# Patient Record
Sex: MALE | Race: WHITE | NOT HISPANIC OR LATINO | Employment: PART TIME | URBAN - METROPOLITAN AREA
[De-identification: names, ages, dates, MRNs, and addresses within clinical notes are randomized per-mention and may not be internally consistent; named-entity substitution may affect disease eponyms.]

---

## 2017-01-31 ENCOUNTER — OFFICE VISIT (OUTPATIENT)
Dept: CARDIOLOGY | Facility: MEDICAL CENTER | Age: 72
End: 2017-01-31
Payer: MEDICARE

## 2017-01-31 VITALS
HEIGHT: 68 IN | HEART RATE: 55 BPM | DIASTOLIC BLOOD PRESSURE: 70 MMHG | BODY MASS INDEX: 27.28 KG/M2 | WEIGHT: 180 LBS | SYSTOLIC BLOOD PRESSURE: 130 MMHG

## 2017-01-31 DIAGNOSIS — I10 ESSENTIAL HYPERTENSION, BENIGN: ICD-10-CM

## 2017-01-31 DIAGNOSIS — R93.1 ELEVATED CORONARY ARTERY CALCIUM SCORE: ICD-10-CM

## 2017-01-31 DIAGNOSIS — E78.5 DYSLIPIDEMIA: ICD-10-CM

## 2017-01-31 PROCEDURE — 1101F PT FALLS ASSESS-DOCD LE1/YR: CPT | Mod: 8P | Performed by: INTERNAL MEDICINE

## 2017-01-31 PROCEDURE — 4040F PNEUMOC VAC/ADMIN/RCVD: CPT | Mod: 8P | Performed by: INTERNAL MEDICINE

## 2017-01-31 PROCEDURE — G8432 DEP SCR NOT DOC, RNG: HCPCS | Performed by: INTERNAL MEDICINE

## 2017-01-31 PROCEDURE — 99214 OFFICE O/P EST MOD 30 MIN: CPT | Performed by: INTERNAL MEDICINE

## 2017-01-31 PROCEDURE — G8484 FLU IMMUNIZE NO ADMIN: HCPCS | Performed by: INTERNAL MEDICINE

## 2017-01-31 PROCEDURE — G8420 CALC BMI NORM PARAMETERS: HCPCS | Performed by: INTERNAL MEDICINE

## 2017-01-31 PROCEDURE — G8598 ASA/ANTIPLAT THER USED: HCPCS | Performed by: INTERNAL MEDICINE

## 2017-01-31 PROCEDURE — 1036F TOBACCO NON-USER: CPT | Performed by: INTERNAL MEDICINE

## 2017-01-31 PROCEDURE — 3017F COLORECTAL CA SCREEN DOC REV: CPT | Mod: 8P | Performed by: INTERNAL MEDICINE

## 2017-01-31 NOTE — PROGRESS NOTES
Subjective:   Jimenez Villalba is a 71 y.o. male who presents today for followup of his coronary disease with a mildly positive coronary calcium score, hyperlipidemia and elevated blood pressure. His echocardiogram showed moderate concentric left ventricular hypertrophy.     His blood pressures at home and running approximately 115-135/65-80.    He continues to exercise regularly. He has had no chest discomfort, dyspnea, edema, palpitations or lightheadedness.    Past Medical History   Diagnosis Date   • Pericarditis    • CAD (coronary artery disease) 12/21/2011   • Abnormal myocardial perfusion study 12/21/2011   • Hyperlipidemia 12/21/2011   • Hypertensive urgency 8/23/2013   • Abdominal pain, other specified site 8/23/2013   • Elevated blood pressure 5/20/2014   • Essential hypertension, benign 7/28/2015     History reviewed. No pertinent past surgical history.  Family History   Problem Relation Age of Onset   • Other Mother      lung ca   • Heart Attack Neg Hx      History   Smoking status   • Former Smoker -- 1.00 packs/day for 10 years   • Quit date: 12/21/1976   Smokeless tobacco   • Former User     Comment: 31 years ago     Allergies   Allergen Reactions   • Nkda [No Known Drug Allergy]      Outpatient Encounter Prescriptions as of 1/31/2017   Medication Sig Dispense Refill   • metoprolol SR (TOPROL XL) 25 MG TABLET SR 24 HR Take 0.5 Tabs by mouth every day. 45 Tab 3   • atorvastatin (LIPITOR) 40 MG Tab TAKE 1 TABLET BY MOUTH EVERY DAY 90 Tab 2   • Multiple Vitamin (MULTI VITAMIN DAILY PO) Take  by mouth.     • lisinopril (PRINIVIL) 5 MG Tab TAKE 1 TABLET BY MOUTH EVERY DAY 90 Tab 3   • Cholecalciferol (VITAMIN D) 400 UNIT Tab Take 400 Units by mouth every day.     • Multiple Vitamins-Minerals (OCUVITE PO) Take  by mouth.     • Omega-3 Fatty Acids (FISH OIL) 1000 MG Cap capsule Take 1,000 mg by mouth 3 times a day, with meals.     • cyanocobalamin (VITAMIN B-12) 100 MCG Tab Take 100 mcg by mouth every day.    "  • Calcium-Vitamin D-Vitamin K (CALCIUM + D + K PO) Take  by mouth.     • Saw Palmetto, Serenoa repens, (SAW PALMETTO PO) Take  by mouth.     • aspirin 81 MG tablet Take 81 mg by mouth every day.       No facility-administered encounter medications on file as of 1/31/2017.     ROS       Objective:   /70 mmHg  Pulse 55  Ht 1.727 m (5' 8\")  Wt 81.647 kg (180 lb)  BMI 27.38 kg/m2    Physical Exam   Neck: No JVD present.   Cardiovascular: Normal rate and regular rhythm.  Exam reveals no gallop.    No murmur heard.  Pulmonary/Chest: Effort normal. He has no rales.   Abdominal: Soft. There is no tenderness.   Musculoskeletal: He exhibits no edema.       Echocardiogram:  CONCLUSIONS  Normal left ventricular chamber size. Moderate concentric left   ventricular hypertrophy, 1.5 cm. Normal regional wall motion. Normal   left ventricular systolic function. Left ventricular ejection fraction   is 65% to 70%. Grade I diastolic dysfunction - mitral inflow E/A is   <1.0.    Exam Date:         05/12/2015       Lab Results   Component Value Date/Time    CHOLESTEROL, 01/18/2017 01:47 PM    LDL 69 01/18/2017 01:47 PM    HDL 83 01/18/2017 01:47 PM    TRIGLYCERIDES 56 01/18/2017 01:47 PM       Lab Results   Component Value Date/Time    SODIUM 139 01/18/2017 01:47 PM    POTASSIUM 4.4 01/18/2017 01:47 PM    CHLORIDE 100 01/18/2017 01:47 PM    CO2 23 01/18/2017 01:47 PM    GLUCOSE 91 01/18/2017 01:47 PM    BUN 11 01/18/2017 01:47 PM    CREATININE 0.79 01/18/2017 01:47 PM    BUN-CREATININE RATIO 14 01/18/2017 01:47 PM     Lab Results   Component Value Date/Time    ALKALINE PHOSPHATASE 55 01/18/2017 01:47 PM    AST(SGOT) 21 01/18/2017 01:47 PM    ALT(SGPT) 24 01/18/2017 01:47 PM    TOTAL BILIRUBIN 0.6 01/18/2017 01:47 PM      Lab Results   Component Value Date/Time    B NATRIURETIC PEPTIDE 24 10/29/2009 12:25 PM                 Assessment:     1. Elevated coronary artery calcium score     2. Dyslipidemia     3. Essential " hypertension, benign         Medical Decision Making:  Today's Assessment / Status / Plan:     Mr. Villalba is clinically stable. His blood pressure and lipid status under good control. He is asymptomatic from a cardiovascular standpoint. He will follow-up in about a year with repeat lab work.

## 2017-01-31 NOTE — Clinical Note
[facility]   [address], [address]  [place] [address]  Phone: [phone]  Fax: [phone]              Jimenez Villalba  1945    Encounter Date: 1/31/2017    Jose L Mcdonald M.D.          PROGRESS NOTE:  Subjective:   Jimenez Villalba is a 71 y.o. male who presents today for followup of his coronary disease with a mildly positive coronary calcium score, hyperlipidemia and elevated blood pressure. His echocardiogram showed moderate concentric left ventricular hypertrophy.     His blood pressures at home and running approximately 115-135/65-80.    He continues to exercise regularly. He has had no chest discomfort, dyspnea, edema, palpitations or lightheadedness.    Past Medical History   Diagnosis Date   • Pericarditis    • CAD (coronary artery disease) 12/21/2011   • Abnormal myocardial perfusion study 12/21/2011   • Hyperlipidemia 12/21/2011   • Hypertensive urgency 8/23/2013   • Abdominal pain, other specified site 8/23/2013   • Elevated blood pressure 5/20/2014   • Essential hypertension, benign 7/28/2015     History reviewed. No pertinent past surgical history.  Family History   Problem Relation Age of Onset   • Other Mother      lung ca   • Heart Attack Neg Hx      History   Smoking status   • Former Smoker -- 1.00 packs/day for 10 years   • Quit date: 12/21/1976   Smokeless tobacco   • Former User     Comment: 31 years ago     Allergies   Allergen Reactions   • Nkda [No Known Drug Allergy]      Outpatient Encounter Prescriptions as of 1/31/2017   Medication Sig Dispense Refill   • metoprolol SR (TOPROL XL) 25 MG TABLET SR 24 HR Take 0.5 Tabs by mouth every day. 45 Tab 3   • atorvastatin (LIPITOR) 40 MG Tab TAKE 1 TABLET BY MOUTH EVERY DAY 90 Tab 2   • Multiple Vitamin (MULTI VITAMIN DAILY PO) Take  by mouth.     • lisinopril (PRINIVIL) 5 MG Tab TAKE 1 TABLET BY MOUTH EVERY DAY 90 Tab 3   • Cholecalciferol (VITAMIN D) 400 UNIT Tab Take 400 Units by  "mouth every day.     • Multiple Vitamins-Minerals (OCUVITE PO) Take  by mouth.     • Omega-3 Fatty Acids (FISH OIL) 1000 MG Cap capsule Take 1,000 mg by mouth 3 times a day, with meals.     • cyanocobalamin (VITAMIN B-12) 100 MCG Tab Take 100 mcg by mouth every day.     • Calcium-Vitamin D-Vitamin K (CALCIUM + D + K PO) Take  by mouth.     • Saw Palmetto, Serenoa repens, (SAW PALMETTO PO) Take  by mouth.     • aspirin 81 MG tablet Take 81 mg by mouth every day.       No facility-administered encounter medications on file as of 1/31/2017.     ROS       Objective:   /70 mmHg  Pulse 55  Ht 1.727 m (5' 8\")  Wt 81.647 kg (180 lb)  BMI 27.38 kg/m2    Physical Exam   Neck: No JVD present.   Cardiovascular: Normal rate and regular rhythm.  Exam reveals no gallop.    No murmur heard.  Pulmonary/Chest: Effort normal. He has no rales.   Abdominal: Soft. There is no tenderness.   Musculoskeletal: He exhibits no edema.       Echocardiogram:  CONCLUSIONS  Normal left ventricular chamber size. Moderate concentric left   ventricular hypertrophy, 1.5 cm. Normal regional wall motion. Normal   left ventricular systolic function. Left ventricular ejection fraction   is 65% to 70%. Grade I diastolic dysfunction - mitral inflow E/A is   <1.0.    Exam Date:         05/12/2015       Lab Results   Component Value Date/Time    CHOLESTEROL, 01/18/2017 01:47 PM    LDL 69 01/18/2017 01:47 PM    HDL 83 01/18/2017 01:47 PM    TRIGLYCERIDES 56 01/18/2017 01:47 PM       Lab Results   Component Value Date/Time    SODIUM 139 01/18/2017 01:47 PM    POTASSIUM 4.4 01/18/2017 01:47 PM    CHLORIDE 100 01/18/2017 01:47 PM    CO2 23 01/18/2017 01:47 PM    GLUCOSE 91 01/18/2017 01:47 PM    BUN 11 01/18/2017 01:47 PM    CREATININE 0.79 01/18/2017 01:47 PM    BUN-CREATININE RATIO 14 01/18/2017 01:47 PM     Lab Results   Component Value Date/Time    ALKALINE PHOSPHATASE 55 01/18/2017 01:47 PM    AST(SGOT) 21 01/18/2017 01:47 PM    ALT(SGPT) 24 " 01/18/2017 01:47 PM    TOTAL BILIRUBIN 0.6 01/18/2017 01:47 PM      Lab Results   Component Value Date/Time    B NATRIURETIC PEPTIDE 24 10/29/2009 12:25 PM                 Assessment:     1. Elevated coronary artery calcium score     2. Dyslipidemia     3. Essential hypertension, benign         Medical Decision Making:  Today's Assessment / Status / Plan:     Mr. Villalba is clinically stable. His blood pressure and lipid status under good control. He is asymptomatic from a cardiovascular standpoint. He will follow-up in about a year with repeat lab work.      Ashley Ayala M.D.  [address] [place] [id number]  VIA Facsimile: [phone]

## 2017-01-31 NOTE — MR AVS SNAPSHOT
"        Jimenez CUETO Deejay   2017 9:00 AM   Office Visit   MRN: [id number]    Department:  [facility]   Dept Phone:  [phone]    Description:  Male : 1945   Provider:  Jose L Mcdonald M.D.           Reason for Visit     Follow-Up labs in epic 2017      Allergies as of 2017     Allergen Noted Reactions    Nkda [No Known Drug Allergy] 10/29/2009         You were diagnosed with     Elevated coronary artery calcium score   [4520578]       Dyslipidemia   [685364]       Essential hypertension, benign   [401.1.ICD-9-CM]         Vital Signs     Blood Pressure Pulse Height Weight Body Mass Index Smoking Status    130/70 mmHg 55 1.727 m (5' 8\") 81.647 kg (180 lb) 27.38 kg/m2 Former Smoker      Basic Information     Date Of Birth Sex Race Ethnicity Preferred Language    1945 Male White Non- English      Problem List              ICD-10-CM Priority Class Noted - Resolved    Elevated coronary artery calcium score I25.10   2011 - Present    Abnormal myocardial perfusion study    2011 - Present    Dyslipidemia E78.5   2011 - Present    Abdominal pain, other specified site R10.9   2013 - Present    Hypertensive urgency I16.0   2013 - Present    Elevated blood pressure I10   2014 - Present    Essential hypertension, benign I10   2015 - Present      Health Maintenance        Date Due Completion Dates    IMM DTaP/Tdap/Td Vaccine (1 - Tdap) 1965 ---    COLONOSCOPY 1996 ---    IMM ZOSTER VACCINE 2006 ---    IMM PNEUMOCOCCAL 65+ (ADULT) LOW/MEDIUM RISK SERIES (1 of 2 - PCV13) 2011 ---    IMM INFLUENZA (1) 2016 ---            Current Immunizations     Influenza Vaccine 10/6/2009      Below and/or attached are the medications your provider expects you to take. Review all of your home medications and newly ordered medications with your provider and/or pharmacist. Follow medication instructions as directed by your provider and/or " pharmacist. Please keep your medication list with you and share with your provider. Update the information when medications are discontinued, doses are changed, or new medications (including over-the-counter products) are added; and carry medication information at all times in the event of emergency situations     Allergies:  NKDA - (reactions not documented)               Medications  Valid as of: January 31, 2017 -  9:39 AM    Generic Name Brand Name Tablet Size Instructions for use    Aspirin (Tab) aspirin 81 MG Take 81 mg by mouth every day.        Atorvastatin Calcium (Tab) LIPITOR 40 MG TAKE 1 TABLET BY MOUTH EVERY DAY        Calcium-Vitamin D-Vitamin K   Take  by mouth.        Cholecalciferol (Tab) VITAMIN D 400 UNIT Take 400 Units by mouth every day.        Cyanocobalamin (Tab) VITAMIN B-12 100 MCG Take 100 mcg by mouth every day.        Lisinopril (Tab) PRINIVIL 5 MG TAKE 1 TABLET BY MOUTH EVERY DAY        Metoprolol Succinate (TABLET SR 24 HR) TOPROL XL 25 MG Take 0.5 Tabs by mouth every day.        Multiple Vitamin   Take  by mouth.        Multiple Vitamins-Minerals   Take  by mouth.        Omega-3 Fatty Acids (Cap) fish oil 1000 MG Take 1,000 mg by mouth 3 times a day, with meals.        Saw Palmetto (Serenoa repens)   Take  by mouth.        .                 Medicines prescribed today were sent to:     [facility] [address] [place] [address] & [place]    [address] [place] [id number]    Phone: [phone] Fax: [phone]    Open 24 Hours?: No      Medication refill instructions:       If your prescription bottle indicates you have medication refills left, it is not necessary to call your provider’s office. Please contact your pharmacy and they will refill your medication.    If your prescription bottle indicates you do not have any refills left, you may request refills at any time through one of the following ways: The online [employer] system (except Urgent  Care), by calling your provider’s office, or by asking your pharmacy to contact your provider’s office with a refill request. Medication refills are processed only during regular business hours and may not be available until the next business day. Your provider may request additional information or to have a follow-up visit with you prior to refilling your medication.   *Please Note: Medication refills are assigned a new Rx number when refilled electronically. Your pharmacy may indicate that no refills were authorized even though a new prescription for the same medication is available at the pharmacy. Please request the medicine by name with the pharmacy before contacting your provider for a refill.        Your To Do List     Future Labs/Procedures Complete By Expires    COMP METABOLIC PANEL  As directed 1/31/2018    LIPID PROFILE  As directed 1/31/2018         FTL SOLAR Access Code: [email or web address]  Expires: 3/2/2017  9:39 AM    FTL SOLAR  A secure, online tool to manage your health information     [employer]’s FTL SOLAR® is a secure, online tool that connects you to your personalized health information from the privacy of your home -- day or night - making it very easy for you to manage your healthcare. Once the activation process is completed, you can even access your medical information using the FTL SOLAR maribell, which is available for free in the Apple Maribell store or Google Play store.     FTL SOLAR provides the following levels of access (as shown below):   My Chart Features   [facility] Doctor [facility]  Urgent  Care Non-[facility]  Primary Care  Doctor   Email your healthcare team securely and privately 24/7 X X X    Manage appointments: schedule your next appointment; view details of past/upcoming appointments X      Request prescription refills. X      View recent personal medical records, including lab and immunizations X X X X   View health record, including health history, allergies,  medications X X X X   Read reports about your outpatient visits, procedures, consult and ER notes X X X X   See your discharge summary, which is a recap of your hospital and/or ER visit that includes your diagnosis, lab results, and care plan. X X       How to register for "[employer]:  1. Go to  [email or web address].  2. Click on the Sign Up Now box, which takes you to the New Member Sign Up page. You will need to provide the following information:  a. Enter your "[employer] Access Code exactly as it appears at the top of this page. (You will not need to use this code after you’ve completed the sign-up process. If you do not sign up before the expiration date, you must request a new code.)   b. Enter your date of birth.   c. Enter your home email address.   d. Click Submit, and follow the next screen’s instructions.  3. Create a "[employer] ID. This will be your "[employer] login ID and cannot be changed, so think of one that is secure and easy to remember.  4. Create a "[employer] password. You can change your password at any time.  5. Enter your Password Reset Question and Answer. This can be used at a later time if you forget your password.   6. Enter your e-mail address. This allows you to receive e-mail notifications when new information is available in "[employer].  7. Click Sign Up. You can now view your health information.    For assistance activating your "[employer] account, call [phone]

## 2017-05-25 DIAGNOSIS — E78.5 DYSLIPIDEMIA: ICD-10-CM

## 2017-05-26 RX ORDER — ATORVASTATIN CALCIUM 40 MG/1
TABLET, FILM COATED ORAL
Qty: 90 TAB | Refills: 3 | Status: SHIPPED | OUTPATIENT
Start: 2017-05-26 | End: 2018-05-17 | Stop reason: SDUPTHER

## 2017-08-07 DIAGNOSIS — I10 ESSENTIAL HYPERTENSION: ICD-10-CM

## 2017-08-10 RX ORDER — LISINOPRIL 5 MG/1
TABLET ORAL
Qty: 90 TAB | Refills: 2 | Status: SHIPPED | OUTPATIENT
Start: 2017-08-10 | End: 2018-05-14 | Stop reason: SDUPTHER

## 2017-12-06 DIAGNOSIS — I25.84 CORONARY ARTERY DISEASE DUE TO CALCIFIED CORONARY LESION: ICD-10-CM

## 2017-12-06 DIAGNOSIS — I25.10 CORONARY ARTERY DISEASE DUE TO CALCIFIED CORONARY LESION: ICD-10-CM

## 2017-12-06 RX ORDER — METOPROLOL SUCCINATE 25 MG/1
12.5 TABLET, EXTENDED RELEASE ORAL DAILY
Qty: 45 TAB | Refills: 0 | Status: SHIPPED | OUTPATIENT
Start: 2017-12-06 | End: 2018-03-24 | Stop reason: SDUPTHER

## 2018-03-21 LAB
ALBUMIN SERPL-MCNC: 4.4 G/DL (ref 3.5–4.8)
ALBUMIN/GLOB SERPL: 1.6 {RATIO} (ref 1.2–2.2)
ALP SERPL-CCNC: 67 IU/L (ref 39–117)
ALT SERPL-CCNC: 28 IU/L (ref 0–44)
AST SERPL-CCNC: 29 IU/L (ref 0–40)
BILIRUB SERPL-MCNC: 0.6 MG/DL (ref 0–1.2)
BUN SERPL-MCNC: 9 MG/DL (ref 8–27)
BUN/CREAT SERPL: 12 (ref 10–24)
CALCIUM SERPL-MCNC: 9.7 MG/DL (ref 8.6–10.2)
CHLORIDE SERPL-SCNC: 103 MMOL/L (ref 96–106)
CHOLEST SERPL-MCNC: 147 MG/DL (ref 100–199)
CO2 SERPL-SCNC: 26 MMOL/L (ref 18–29)
CREAT SERPL-MCNC: 0.73 MG/DL (ref 0.76–1.27)
GFR SERPLBLD CREATININE-BSD FMLA CKD-EPI: 108 ML/MIN/1.73
GFR SERPLBLD CREATININE-BSD FMLA CKD-EPI: 93 ML/MIN/1.73
GLOBULIN SER CALC-MCNC: 2.7 G/DL (ref 1.5–4.5)
GLUCOSE SERPL-MCNC: 82 MG/DL (ref 65–99)
HDLC SERPL-MCNC: 69 MG/DL
LABORATORY COMMENT REPORT: NORMAL
LDLC SERPL CALC-MCNC: 62 MG/DL (ref 0–99)
POTASSIUM SERPL-SCNC: 4.6 MMOL/L (ref 3.5–5.2)
PROT SERPL-MCNC: 7.1 G/DL (ref 6–8.5)
SODIUM SERPL-SCNC: 144 MMOL/L (ref 134–144)
TRIGL SERPL-MCNC: 81 MG/DL (ref 0–149)
VLDLC SERPL CALC-MCNC: 16 MG/DL (ref 5–40)

## 2018-03-24 DIAGNOSIS — I25.10 CORONARY ARTERY DISEASE DUE TO CALCIFIED CORONARY LESION: ICD-10-CM

## 2018-03-24 DIAGNOSIS — I25.84 CORONARY ARTERY DISEASE DUE TO CALCIFIED CORONARY LESION: ICD-10-CM

## 2018-03-27 RX ORDER — METOPROLOL SUCCINATE 25 MG/1
TABLET, EXTENDED RELEASE ORAL
Qty: 45 TAB | Refills: 0 | Status: SHIPPED | OUTPATIENT
Start: 2018-03-27 | End: 2018-06-19 | Stop reason: SDUPTHER

## 2018-03-29 ENCOUNTER — OFFICE VISIT (OUTPATIENT)
Dept: CARDIOLOGY | Facility: MEDICAL CENTER | Age: 73
End: 2018-03-29
Payer: MEDICARE

## 2018-03-29 VITALS
OXYGEN SATURATION: 96 % | DIASTOLIC BLOOD PRESSURE: 60 MMHG | HEIGHT: 68 IN | BODY MASS INDEX: 27.58 KG/M2 | SYSTOLIC BLOOD PRESSURE: 112 MMHG | WEIGHT: 182 LBS | HEART RATE: 52 BPM

## 2018-03-29 DIAGNOSIS — I25.10 CORONARY ARTERY CALCIFICATION SEEN ON CT SCAN: ICD-10-CM

## 2018-03-29 DIAGNOSIS — I10 ESSENTIAL HYPERTENSION, BENIGN: ICD-10-CM

## 2018-03-29 DIAGNOSIS — E78.5 DYSLIPIDEMIA: ICD-10-CM

## 2018-03-29 PROCEDURE — 99214 OFFICE O/P EST MOD 30 MIN: CPT | Performed by: INTERNAL MEDICINE

## 2018-03-29 RX ORDER — INFLUENZA A VIRUS A/MICHIGAN/45/2015 X-275 (H1N1) ANTIGEN (FORMALDEHYDE INACTIVATED), INFLUENZA A VIRUS A/SINGAPORE/INFIMH-16-0019/2016 IVR-186 (H3N2) ANTIGEN (FORMALDEHYDE INACTIVATED), AND INFLUENZA B VIRUS B/MARYLAND/15/2016 BX-69A (A B/COLORADO/6/2017-LIKE VIRUS) ANTIGEN (FORMALDEHYDE INACTIVATED) 60; 60; 60 UG/.5ML; UG/.5ML; UG/.5ML
INJECTION, SUSPENSION INTRAMUSCULAR
COMMUNITY
Start: 2018-01-30 | End: 2021-10-13

## 2018-03-29 NOTE — LETTER
[facility]   [address], [address]  [place] [address]  Phone: [phone]  Fax: [phone]              Jimenez Villalba  1945    Encounter Date: 3/29/2018    Jose L Mcdonald M.D.          PROGRESS NOTE:  Chief Complaint   Patient presents with   • Coronary Artery Disease   • HTN (Controlled)       Subjective:   Jimenez Villalba is a 72 y.o. male who presents today for followup of his coronary disease with a mildly positive coronary calcium score, hyperlipidemia and elevated blood pressure. His echocardiogram showed moderate concentric left ventricular hypertrophy.    He is walking 4-5 miles 4-5 days weekly. He's had no chest discomfort, dyspnea, edema, palpitations or lightheadedness.    His blood pressures at home and running from approximately 105-135/65-80. Generally, systolic pressures are between 115 and 125.    Past Medical History:   Diagnosis Date   • Abdominal pain, other specified site 8/23/2013   • Abnormal myocardial perfusion study 12/21/2011   • CAD (coronary artery disease) 12/21/2011   • Elevated blood pressure 5/20/2014   • Essential hypertension, benign 7/28/2015   • Hyperlipidemia 12/21/2011   • Hypertensive urgency 8/23/2013   • Pericarditis      History reviewed. No pertinent surgical history.  Family History   Problem Relation Age of Onset   • Other Mother      lung ca   • Heart Attack Neg Hx      Social History     Social History   • Marital status:      Spouse name: N/A   • Number of children: N/A   • Years of education: N/A     Occupational History   • Not on file.     Social History Main Topics   • Smoking status: Former Smoker     Packs/day: 1.00     Years: 10.00     Quit date: 12/21/1976   • Smokeless tobacco: Former User      Comment: 31 years ago   • Alcohol use 10.0 oz/week     20 Glasses of wine per week      Comment: daily   • Drug use: Unknown   • Sexual activity: Not on file     Other Topics Concern   • Not on file  "    Social History Narrative   • No narrative on file     Allergies   Allergen Reactions   • Nkda [No Known Drug Allergy]      Outpatient Encounter Prescriptions as of 3/29/2018   Medication Sig Dispense Refill   • metoprolol SR (TOPROL XL) 25 MG TABLET SR 24 HR TAKE 1/2 TABLET BY MOUTH EVERY DAY 45 Tab 0   • lisinopril (PRINIVIL) 5 MG Tab TAKE 1 TABLET BY MOUTH EVERY DAY 90 Tab 2   • atorvastatin (LIPITOR) 40 MG Tab TAKE 1 TABLET BY MOUTH EVERY DAY 90 Tab 3   • Multiple Vitamin (MULTI VITAMIN DAILY PO) Take  by mouth.     • Cholecalciferol (VITAMIN D) 400 UNIT Tab Take 400 Units by mouth every day.     • Multiple Vitamins-Minerals (OCUVITE PO) Take  by mouth.     • Omega-3 Fatty Acids (FISH OIL) 1000 MG Cap capsule Take 1,000 mg by mouth 3 times a day, with meals.     • cyanocobalamin (VITAMIN B-12) 100 MCG Tab Take 100 mcg by mouth every day.     • Calcium-Vitamin D-Vitamin K (CALCIUM + D + K PO) Take  by mouth.     • Saw Palmetto, Serenoa repens, (SAW PALMETTO PO) Take  by mouth.     • aspirin 81 MG tablet Take 81 mg by mouth every day.     • FLUZONE HIGH-DOSE 0.5 ML Suspension Prefilled Syringe injection        No facility-administered encounter medications on file as of 3/29/2018.      ROS     Objective:   /60   Pulse (!) 52   Ht 1.727 m (5' 8\")   Wt 82.6 kg (182 lb)   SpO2 96%   BMI 27.67 kg/m²      Physical Exam   Neck: No JVD present.   Cardiovascular: Normal rate and regular rhythm.    No murmur heard.  Pulmonary/Chest: Effort normal and breath sounds normal. He has no rales.   Abdominal: Soft. There is no tenderness.   Musculoskeletal: He exhibits no edema.       Lab Results   Component Value Date/Time    CHOLSTRLTOT 147 03/20/2018 03:25 PM    CHOLSTRLTOT 150 04/26/2013 09:20 AM    LDL 62 03/20/2018 03:25 PM    LDL 58 04/26/2013 09:20 AM    HDL 69 03/20/2018 03:25 PM    HDL 72 04/26/2013 09:20 AM    TRIGLYCERIDE 81 03/20/2018 03:25 PM    TRIGLYCERIDE 98 04/26/2013 09:20 AM       Lab Results   "   Component Value Date/Time    SODIUM 144 03/20/2018 03:25 PM    SODIUM 136 08/24/2013 05:30 AM    POTASSIUM 4.6 03/20/2018 03:25 PM    POTASSIUM 4.1 08/24/2013 05:30 AM    CHLORIDE 103 03/20/2018 03:25 PM    CHLORIDE 104 08/24/2013 05:30 AM    CO2 26 03/20/2018 03:25 PM    CO2 27 08/24/2013 05:30 AM    GLUCOSE 82 03/20/2018 03:25 PM    GLUCOSE 101 (H) 08/24/2013 05:30 AM    BUN 9 03/20/2018 03:25 PM    BUN 9 08/24/2013 05:30 AM    CREATININE 0.73 (L) 03/20/2018 03:25 PM    CREATININE 0.89 08/24/2013 05:30 AM    BUNCREATRAT 12 03/20/2018 03:25 PM     Lab Results   Component Value Date/Time    ALKPHOSPHAT 67 03/20/2018 03:25 PM    ALKPHOSPHAT 37 08/24/2013 05:30 AM    ASTSGOT 29 03/20/2018 03:25 PM    ASTSGOT 20 08/24/2013 05:30 AM    ALTSGPT 28 03/20/2018 03:25 PM    ALTSGPT 22 08/24/2013 05:30 AM    TBILIRUBIN 0.6 03/20/2018 03:25 PM    TBILIRUBIN 1.1 08/24/2013 05:30 AM        Echocardiogram:  CONCLUSIONS  Normal left ventricular chamber size. Moderate concentric left   ventricular hypertrophy, 1.5 cm. Normal regional wall motion. Normal   left ventricular systolic function. Left ventricular ejection fraction   is 65% to 70%. Grade I diastolic dysfunction - mitral inflow E/A is   <1.0.    Exam Date:         05/12/2015       Assessment:     1. Coronary artery calcification seen on CT scan    2. Dyslipidemia    3. Essential hypertension, benign          Medical Decision Making:  Today's Assessment / Status / Plan:     Mr. Villalba is clinically stable. We discussed the possibility of increase atorvastatin but he would like to stay on his current dosage. His LDL is under fairly good control and he is on 40 mg daily. His blood pressure is under excellent control. I feel he can follow up in a year with repeat lab work.        Ashley Ayala M.D.  [address] [place] [id number]  VIA Facsimile: [phone]

## 2018-03-29 NOTE — PROGRESS NOTES
Chief Complaint   Patient presents with   • Coronary Artery Disease   • HTN (Controlled)       Subjective:   Jimenez Villalba is a 72 y.o. male who presents today for followup of his coronary disease with a mildly positive coronary calcium score, hyperlipidemia and elevated blood pressure. His echocardiogram showed moderate concentric left ventricular hypertrophy.    He is walking 4-5 miles 4-5 days weekly. He's had no chest discomfort, dyspnea, edema, palpitations or lightheadedness.    His blood pressures at home and running from approximately 105-135/65-80. Generally, systolic pressures are between 115 and 125.    Past Medical History:   Diagnosis Date   • Abdominal pain, other specified site 8/23/2013   • Abnormal myocardial perfusion study 12/21/2011   • CAD (coronary artery disease) 12/21/2011   • Elevated blood pressure 5/20/2014   • Essential hypertension, benign 7/28/2015   • Hyperlipidemia 12/21/2011   • Hypertensive urgency 8/23/2013   • Pericarditis      History reviewed. No pertinent surgical history.  Family History   Problem Relation Age of Onset   • Other Mother      lung ca   • Heart Attack Neg Hx      Social History     Social History   • Marital status:      Spouse name: N/A   • Number of children: N/A   • Years of education: N/A     Occupational History   • Not on file.     Social History Main Topics   • Smoking status: Former Smoker     Packs/day: 1.00     Years: 10.00     Quit date: 12/21/1976   • Smokeless tobacco: Former User      Comment: 31 years ago   • Alcohol use 10.0 oz/week     20 Glasses of wine per week      Comment: daily   • Drug use: Unknown   • Sexual activity: Not on file     Other Topics Concern   • Not on file     Social History Narrative   • No narrative on file     Allergies   Allergen Reactions   • Nkda [No Known Drug Allergy]      Outpatient Encounter Prescriptions as of 3/29/2018   Medication Sig Dispense Refill   • metoprolol SR (TOPROL XL) 25 MG TABLET SR 24 HR  "TAKE 1/2 TABLET BY MOUTH EVERY DAY 45 Tab 0   • lisinopril (PRINIVIL) 5 MG Tab TAKE 1 TABLET BY MOUTH EVERY DAY 90 Tab 2   • atorvastatin (LIPITOR) 40 MG Tab TAKE 1 TABLET BY MOUTH EVERY DAY 90 Tab 3   • Multiple Vitamin (MULTI VITAMIN DAILY PO) Take  by mouth.     • Cholecalciferol (VITAMIN D) 400 UNIT Tab Take 400 Units by mouth every day.     • Multiple Vitamins-Minerals (OCUVITE PO) Take  by mouth.     • Omega-3 Fatty Acids (FISH OIL) 1000 MG Cap capsule Take 1,000 mg by mouth 3 times a day, with meals.     • cyanocobalamin (VITAMIN B-12) 100 MCG Tab Take 100 mcg by mouth every day.     • Calcium-Vitamin D-Vitamin K (CALCIUM + D + K PO) Take  by mouth.     • Saw Palmetto, Serenoa repens, (SAW PALMETTO PO) Take  by mouth.     • aspirin 81 MG tablet Take 81 mg by mouth every day.     • FLUZONE HIGH-DOSE 0.5 ML Suspension Prefilled Syringe injection        No facility-administered encounter medications on file as of 3/29/2018.      ROS     Objective:   /60   Pulse (!) 52   Ht 1.727 m (5' 8\")   Wt 82.6 kg (182 lb)   SpO2 96%   BMI 27.67 kg/m²     Physical Exam   Neck: No JVD present.   Cardiovascular: Normal rate and regular rhythm.    No murmur heard.  Pulmonary/Chest: Effort normal and breath sounds normal. He has no rales.   Abdominal: Soft. There is no tenderness.   Musculoskeletal: He exhibits no edema.       Lab Results   Component Value Date/Time    CHOLSTRLTOT 147 03/20/2018 03:25 PM    CHOLSTRLTOT 150 04/26/2013 09:20 AM    LDL 62 03/20/2018 03:25 PM    LDL 58 04/26/2013 09:20 AM    HDL 69 03/20/2018 03:25 PM    HDL 72 04/26/2013 09:20 AM    TRIGLYCERIDE 81 03/20/2018 03:25 PM    TRIGLYCERIDE 98 04/26/2013 09:20 AM       Lab Results   Component Value Date/Time    SODIUM 144 03/20/2018 03:25 PM    SODIUM 136 08/24/2013 05:30 AM    POTASSIUM 4.6 03/20/2018 03:25 PM    POTASSIUM 4.1 08/24/2013 05:30 AM    CHLORIDE 103 03/20/2018 03:25 PM    CHLORIDE 104 08/24/2013 05:30 AM    CO2 26 03/20/2018 03:25 " PM    CO2 27 08/24/2013 05:30 AM    GLUCOSE 82 03/20/2018 03:25 PM    GLUCOSE 101 (H) 08/24/2013 05:30 AM    BUN 9 03/20/2018 03:25 PM    BUN 9 08/24/2013 05:30 AM    CREATININE 0.73 (L) 03/20/2018 03:25 PM    CREATININE 0.89 08/24/2013 05:30 AM    BUNCREATRAT 12 03/20/2018 03:25 PM     Lab Results   Component Value Date/Time    ALKPHOSPHAT 67 03/20/2018 03:25 PM    ALKPHOSPHAT 37 08/24/2013 05:30 AM    ASTSGOT 29 03/20/2018 03:25 PM    ASTSGOT 20 08/24/2013 05:30 AM    ALTSGPT 28 03/20/2018 03:25 PM    ALTSGPT 22 08/24/2013 05:30 AM    TBILIRUBIN 0.6 03/20/2018 03:25 PM    TBILIRUBIN 1.1 08/24/2013 05:30 AM        Echocardiogram:  CONCLUSIONS  Normal left ventricular chamber size. Moderate concentric left   ventricular hypertrophy, 1.5 cm. Normal regional wall motion. Normal   left ventricular systolic function. Left ventricular ejection fraction   is 65% to 70%. Grade I diastolic dysfunction - mitral inflow E/A is   <1.0.    Exam Date:         05/12/2015       Assessment:     1. Coronary artery calcification seen on CT scan    2. Dyslipidemia    3. Essential hypertension, benign          Medical Decision Making:  Today's Assessment / Status / Plan:     Mr. Villalba is clinically stable. We discussed the possibility of increase atorvastatin but he would like to stay on his current dosage. His LDL is under fairly good control and he is on 40 mg daily. His blood pressure is under excellent control. I feel he can follow up in a year with repeat lab work.

## 2018-05-14 DIAGNOSIS — I10 ESSENTIAL HYPERTENSION: ICD-10-CM

## 2018-05-16 RX ORDER — LISINOPRIL 5 MG/1
TABLET ORAL
Qty: 90 TAB | Refills: 3 | Status: SHIPPED | OUTPATIENT
Start: 2018-05-16 | End: 2019-05-12 | Stop reason: SDUPTHER

## 2018-05-17 DIAGNOSIS — E78.5 DYSLIPIDEMIA: ICD-10-CM

## 2018-05-21 RX ORDER — ATORVASTATIN CALCIUM 40 MG/1
TABLET, FILM COATED ORAL
Qty: 90 TAB | Refills: 3 | Status: SHIPPED | OUTPATIENT
Start: 2018-05-21 | End: 2019-05-27 | Stop reason: SDUPTHER

## 2018-06-19 DIAGNOSIS — I25.10 CORONARY ARTERY DISEASE DUE TO CALCIFIED CORONARY LESION: ICD-10-CM

## 2018-06-19 DIAGNOSIS — I25.84 CORONARY ARTERY DISEASE DUE TO CALCIFIED CORONARY LESION: ICD-10-CM

## 2018-06-21 RX ORDER — METOPROLOL SUCCINATE 25 MG/1
TABLET, EXTENDED RELEASE ORAL
Qty: 45 TAB | Refills: 3 | Status: SHIPPED | OUTPATIENT
Start: 2018-06-21 | End: 2019-06-18 | Stop reason: SDUPTHER

## 2019-03-16 LAB
ALBUMIN SERPL-MCNC: 4.2 G/DL (ref 3.5–4.8)
ALBUMIN/GLOB SERPL: 1.8 {RATIO} (ref 1.2–2.2)
ALP SERPL-CCNC: 57 IU/L (ref 39–117)
ALT SERPL-CCNC: 23 IU/L (ref 0–44)
AST SERPL-CCNC: 26 IU/L (ref 0–40)
BILIRUB SERPL-MCNC: 0.6 MG/DL (ref 0–1.2)
BUN SERPL-MCNC: 9 MG/DL (ref 8–27)
BUN/CREAT SERPL: 13 (ref 10–24)
CALCIUM SERPL-MCNC: 9 MG/DL (ref 8.6–10.2)
CHLORIDE SERPL-SCNC: 107 MMOL/L (ref 96–106)
CHOLEST SERPL-MCNC: 150 MG/DL (ref 100–199)
CO2 SERPL-SCNC: 23 MMOL/L (ref 20–29)
CREAT SERPL-MCNC: 0.67 MG/DL (ref 0.76–1.27)
GLOBULIN SER CALC-MCNC: 2.4 G/DL (ref 1.5–4.5)
GLUCOSE SERPL-MCNC: 86 MG/DL (ref 65–99)
HDLC SERPL-MCNC: 72 MG/DL
LABORATORY COMMENT REPORT: NORMAL
LDLC SERPL CALC-MCNC: 66 MG/DL (ref 0–99)
POTASSIUM SERPL-SCNC: 4.4 MMOL/L (ref 3.5–5.2)
PROT SERPL-MCNC: 6.6 G/DL (ref 6–8.5)
SODIUM SERPL-SCNC: 146 MMOL/L (ref 134–144)
TRIGL SERPL-MCNC: 62 MG/DL (ref 0–149)
VLDLC SERPL CALC-MCNC: 12 MG/DL (ref 5–40)

## 2019-03-28 ENCOUNTER — OFFICE VISIT (OUTPATIENT)
Dept: CARDIOLOGY | Facility: MEDICAL CENTER | Age: 74
End: 2019-03-28
Payer: MEDICARE

## 2019-03-28 VITALS
WEIGHT: 185 LBS | BODY MASS INDEX: 28.04 KG/M2 | SYSTOLIC BLOOD PRESSURE: 124 MMHG | OXYGEN SATURATION: 98 % | HEART RATE: 64 BPM | HEIGHT: 68 IN | DIASTOLIC BLOOD PRESSURE: 80 MMHG

## 2019-03-28 DIAGNOSIS — I25.84 CORONARY ARTERY DISEASE DUE TO CALCIFIED CORONARY LESION: ICD-10-CM

## 2019-03-28 DIAGNOSIS — E78.5 DYSLIPIDEMIA: ICD-10-CM

## 2019-03-28 DIAGNOSIS — I10 ESSENTIAL HYPERTENSION, BENIGN: ICD-10-CM

## 2019-03-28 DIAGNOSIS — I25.10 CORONARY ARTERY DISEASE DUE TO CALCIFIED CORONARY LESION: ICD-10-CM

## 2019-03-28 DIAGNOSIS — Z91.89 OTHER SPECIFIED PERSONAL RISK FACTORS, NOT ELSEWHERE CLASSIFIED: ICD-10-CM

## 2019-03-28 DIAGNOSIS — R94.31 NONSPECIFIC ABNORMAL ELECTROCARDIOGRAM (ECG) (EKG): ICD-10-CM

## 2019-03-28 DIAGNOSIS — I25.10 CORONARY ARTERY CALCIFICATION SEEN ON CT SCAN: ICD-10-CM

## 2019-03-28 PROCEDURE — 99214 OFFICE O/P EST MOD 30 MIN: CPT | Performed by: INTERNAL MEDICINE

## 2019-03-28 PROCEDURE — 93000 ELECTROCARDIOGRAM COMPLETE: CPT | Performed by: INTERNAL MEDICINE

## 2019-03-28 NOTE — LETTER
[facility]   [address], [address]  [place] [address]  Phone: [phone]  Fax: [phone]              Jimenez Villalba  1945    Encounter Date: 3/28/2019    Jose L Mcdonald M.D.          PROGRESS NOTE:  Chief Complaint   Patient presents with   • Hyperlipidemia     F/v: 1 YR/ LABS IN EPIC       Subjective:   Jimenez Villalba is a 73 y.o. male who presents today for followup of his coronary disease with a mildly positive coronary calcium score, hyperlipidemia and elevated blood pressure. His echocardiogram showed moderate concentric left ventricular hypertrophy.    His blood pressures at home of been running approximately 120/75.    He is walking for at least an hour 3-4 times a week.  He denies any chest discomfort, dyspnea, edema, palpitations or lightheadedness.          Past Medical History:   Diagnosis Date   • Abdominal pain, other specified site 8/23/2013   • Abnormal myocardial perfusion study 12/21/2011   • CAD (coronary artery disease) 12/21/2011   • Elevated blood pressure 5/20/2014   • Essential hypertension, benign 7/28/2015   • Hyperlipidemia 12/21/2011   • Hypertensive urgency 8/23/2013   • Pericarditis      History reviewed. No pertinent surgical history.  Family History   Problem Relation Age of Onset   • Other Mother         lung ca   • Heart Attack Neg Hx      Social History     Social History   • Marital status:      Spouse name: N/A   • Number of children: N/A   • Years of education: N/A     Occupational History   • Not on file.     Social History Main Topics   • Smoking status: Former Smoker     Packs/day: 1.00     Years: 10.00     Quit date: 12/21/1976   • Smokeless tobacco: Former User      Comment: 31 years ago   • Alcohol use 10.0 oz/week     20 Glasses of wine per week      Comment: daily   • Drug use: Unknown   • Sexual activity: Not on file     Other Topics Concern   • Not on file     Social History Narrative   • No  "narrative on file     Allergies   Allergen Reactions   • Nkda [No Known Drug Allergy]      Outpatient Encounter Prescriptions as of 3/28/2019   Medication Sig Dispense Refill   • Multiple Vitamins-Minerals (PRESERVISION AREDS 2 PO) Take  by mouth.     • metoprolol SR (TOPROL XL) 25 MG TABLET SR 24 HR TAKE 1/2 TABLET BY MOUTH EVERY DAY 45 Tab 3   • atorvastatin (LIPITOR) 40 MG Tab TAKE 1 TABLET BY MOUTH EVERY DAY 90 Tab 3   • lisinopril (PRINIVIL) 5 MG Tab TAKE 1 TABLET BY MOUTH EVERY DAY 90 Tab 3   • Multiple Vitamin (MULTI VITAMIN DAILY PO) Take  by mouth.     • Cholecalciferol (VITAMIN D) 400 UNIT Tab Take 400 Units by mouth every day.     • Omega-3 Fatty Acids (FISH OIL) 1000 MG Cap capsule Take 1,000 mg by mouth 3 times a day, with meals.     • cyanocobalamin (VITAMIN B-12) 100 MCG Tab Take 100 mcg by mouth every day.     • Calcium-Vitamin D-Vitamin K (CALCIUM + D + K PO) Take  by mouth.     • Saw Palmetto, Serenoa repens, (SAW PALMETTO PO) Take  by mouth.     • aspirin 81 MG tablet Take 81 mg by mouth every day.     • FLUZONE HIGH-DOSE 0.5 ML Suspension Prefilled Syringe injection      • Multiple Vitamins-Minerals (OCUVITE PO) Take  by mouth.       No facility-administered encounter medications on file as of 3/28/2019.      ROS     Objective:   /80 (BP Location: Left arm, Patient Position: Sitting, BP Cuff Size: Adult)   Pulse 64   Ht 1.727 m (5' 8\")   Wt 83.9 kg (185 lb)   SpO2 98%   BMI 28.13 kg/m²      Physical Exam   Constitutional: He appears well-developed and well-nourished.   Neck: No JVD present.   Cardiovascular: Normal rate.  An irregular rhythm present. Frequent extrasystoles are present.   No murmur heard.  Pulmonary/Chest: Effort normal and breath sounds normal. He has no rales.   Abdominal: Soft. There is no tenderness.   Musculoskeletal: He exhibits no edema.     Lab Results   Component Value Date/Time    CHOLSTRLTOT 150 03/15/2019 03:08 PM    CHOLSTRLTOT 150 04/26/2013 09:20 AM    " LDL 66 03/15/2019 03:08 PM    LDL 58 04/26/2013 09:20 AM    HDL 72 03/15/2019 03:08 PM    HDL 72 04/26/2013 09:20 AM    TRIGLYCERIDE 62 03/15/2019 03:08 PM    TRIGLYCERIDE 98 04/26/2013 09:20 AM       Lab Results   Component Value Date/Time    SODIUM 146 (H) 03/15/2019 03:08 PM    SODIUM 136 08/24/2013 05:30 AM    POTASSIUM 4.4 03/15/2019 03:08 PM    POTASSIUM 4.1 08/24/2013 05:30 AM    CHLORIDE 107 (H) 03/15/2019 03:08 PM    CHLORIDE 104 08/24/2013 05:30 AM    CO2 23 03/15/2019 03:08 PM    CO2 27 08/24/2013 05:30 AM    GLUCOSE 86 03/15/2019 03:08 PM    GLUCOSE 101 (H) 08/24/2013 05:30 AM    BUN 9 03/15/2019 03:08 PM    BUN 9 08/24/2013 05:30 AM    CREATININE 0.67 (L) 03/15/2019 03:08 PM    CREATININE 0.89 08/24/2013 05:30 AM    BUNCREATRAT 13 03/15/2019 03:08 PM     Lab Results   Component Value Date/Time    ALKPHOSPHAT 57 03/15/2019 03:08 PM    ALKPHOSPHAT 37 08/24/2013 05:30 AM    ASTSGOT 26 03/15/2019 03:08 PM    ASTSGOT 20 08/24/2013 05:30 AM    ALTSGPT 23 03/15/2019 03:08 PM    ALTSGPT 22 08/24/2013 05:30 AM    TBILIRUBIN 0.6 03/15/2019 03:08 PM    TBILIRUBIN 1.1 08/24/2013 05:30 AM      Lab Results   Component Value Date/Time    BNPBTYPENAT 24 10/29/2009 12:25 PM      EKG: This shows a normal sinus rhythm with multiple PACs.  There is poor anterior R wave progression with a possible old anterior infarction.    Assessment:     1. Coronary artery calcification seen on CT scan     2. Dyslipidemia     3. Essential hypertension, benign         Medical Decision Making:  Today's Assessment / Status / Plan:     Mr. Villalba is clinically stable.  He was having frequent ectopics on examination today and his EKG showed frequent PACs.  However, poor anterior R wave progression with a possible prior anterior infarction was noted on his EKG which was not mentioned on his stress test in 2009.  At this time, I feel he should be further evaluated with a stress echocardiogram.  He will follow-up in a couple of months, after  that procedure.  He is leaving for vacation for several weeks.  Lipid status and blood pressure appear to be under good control and he is asymptomatic from a cardiovascular standpoint.  He is exercising regularly without difficulty.      Ashley Ayala M.D.  [address] [place] [id number]  VIA Facsimile: [phone]

## 2019-03-28 NOTE — PROGRESS NOTES
Chief Complaint   Patient presents with   • Hyperlipidemia     F/v: 1 YR/ LABS IN EPIC       Subjective:   Jimenez Villalba is a 73 y.o. male who presents today for followup of his coronary disease with a mildly positive coronary calcium score, hyperlipidemia and elevated blood pressure. His echocardiogram showed moderate concentric left ventricular hypertrophy.    His blood pressures at home of been running approximately 120/75.    He is walking for at least an hour 3-4 times a week.  He denies any chest discomfort, dyspnea, edema, palpitations or lightheadedness.          Past Medical History:   Diagnosis Date   • Abdominal pain, other specified site 8/23/2013   • Abnormal myocardial perfusion study 12/21/2011   • CAD (coronary artery disease) 12/21/2011   • Elevated blood pressure 5/20/2014   • Essential hypertension, benign 7/28/2015   • Hyperlipidemia 12/21/2011   • Hypertensive urgency 8/23/2013   • Pericarditis      History reviewed. No pertinent surgical history.  Family History   Problem Relation Age of Onset   • Other Mother         lung ca   • Heart Attack Neg Hx      Social History     Social History   • Marital status:      Spouse name: N/A   • Number of children: N/A   • Years of education: N/A     Occupational History   • Not on file.     Social History Main Topics   • Smoking status: Former Smoker     Packs/day: 1.00     Years: 10.00     Quit date: 12/21/1976   • Smokeless tobacco: Former User      Comment: 31 years ago   • Alcohol use 10.0 oz/week     20 Glasses of wine per week      Comment: daily   • Drug use: Unknown   • Sexual activity: Not on file     Other Topics Concern   • Not on file     Social History Narrative   • No narrative on file     Allergies   Allergen Reactions   • Nkda [No Known Drug Allergy]      Outpatient Encounter Prescriptions as of 3/28/2019   Medication Sig Dispense Refill   • Multiple Vitamins-Minerals (PRESERVISION AREDS 2 PO) Take  by mouth.     • metoprolol SR  "(TOPROL XL) 25 MG TABLET SR 24 HR TAKE 1/2 TABLET BY MOUTH EVERY DAY 45 Tab 3   • atorvastatin (LIPITOR) 40 MG Tab TAKE 1 TABLET BY MOUTH EVERY DAY 90 Tab 3   • lisinopril (PRINIVIL) 5 MG Tab TAKE 1 TABLET BY MOUTH EVERY DAY 90 Tab 3   • Multiple Vitamin (MULTI VITAMIN DAILY PO) Take  by mouth.     • Cholecalciferol (VITAMIN D) 400 UNIT Tab Take 400 Units by mouth every day.     • Omega-3 Fatty Acids (FISH OIL) 1000 MG Cap capsule Take 1,000 mg by mouth 3 times a day, with meals.     • cyanocobalamin (VITAMIN B-12) 100 MCG Tab Take 100 mcg by mouth every day.     • Calcium-Vitamin D-Vitamin K (CALCIUM + D + K PO) Take  by mouth.     • Saw Palmetto, Serenoa repens, (SAW PALMETTO PO) Take  by mouth.     • aspirin 81 MG tablet Take 81 mg by mouth every day.     • FLUZONE HIGH-DOSE 0.5 ML Suspension Prefilled Syringe injection      • Multiple Vitamins-Minerals (OCUVITE PO) Take  by mouth.       No facility-administered encounter medications on file as of 3/28/2019.      ROS     Objective:   /80 (BP Location: Left arm, Patient Position: Sitting, BP Cuff Size: Adult)   Pulse 64   Ht 1.727 m (5' 8\")   Wt 83.9 kg (185 lb)   SpO2 98%   BMI 28.13 kg/m²     Physical Exam   Constitutional: He appears well-developed and well-nourished.   Neck: No JVD present.   Cardiovascular: Normal rate.  An irregular rhythm present. Frequent extrasystoles are present.   No murmur heard.  Pulmonary/Chest: Effort normal and breath sounds normal. He has no rales.   Abdominal: Soft. There is no tenderness.   Musculoskeletal: He exhibits no edema.     Lab Results   Component Value Date/Time    CHOLSTRLTOT 150 03/15/2019 03:08 PM    CHOLSTRLTOT 150 04/26/2013 09:20 AM    LDL 66 03/15/2019 03:08 PM    LDL 58 04/26/2013 09:20 AM    HDL 72 03/15/2019 03:08 PM    HDL 72 04/26/2013 09:20 AM    TRIGLYCERIDE 62 03/15/2019 03:08 PM    TRIGLYCERIDE 98 04/26/2013 09:20 AM       Lab Results   Component Value Date/Time    SODIUM 146 (H) 03/15/2019 " 03:08 PM    SODIUM 136 08/24/2013 05:30 AM    POTASSIUM 4.4 03/15/2019 03:08 PM    POTASSIUM 4.1 08/24/2013 05:30 AM    CHLORIDE 107 (H) 03/15/2019 03:08 PM    CHLORIDE 104 08/24/2013 05:30 AM    CO2 23 03/15/2019 03:08 PM    CO2 27 08/24/2013 05:30 AM    GLUCOSE 86 03/15/2019 03:08 PM    GLUCOSE 101 (H) 08/24/2013 05:30 AM    BUN 9 03/15/2019 03:08 PM    BUN 9 08/24/2013 05:30 AM    CREATININE 0.67 (L) 03/15/2019 03:08 PM    CREATININE 0.89 08/24/2013 05:30 AM    BUNCREATRAT 13 03/15/2019 03:08 PM     Lab Results   Component Value Date/Time    ALKPHOSPHAT 57 03/15/2019 03:08 PM    ALKPHOSPHAT 37 08/24/2013 05:30 AM    ASTSGOT 26 03/15/2019 03:08 PM    ASTSGOT 20 08/24/2013 05:30 AM    ALTSGPT 23 03/15/2019 03:08 PM    ALTSGPT 22 08/24/2013 05:30 AM    TBILIRUBIN 0.6 03/15/2019 03:08 PM    TBILIRUBIN 1.1 08/24/2013 05:30 AM      Lab Results   Component Value Date/Time    BNPBTYPENAT 24 10/29/2009 12:25 PM      EKG: This shows a normal sinus rhythm with multiple PACs.  There is poor anterior R wave progression with a possible old anterior infarction.    Assessment:     1. Coronary artery calcification seen on CT scan     2. Dyslipidemia     3. Essential hypertension, benign         Medical Decision Making:  Today's Assessment / Status / Plan:     Mr. Villalba is clinically stable.  He was having frequent ectopics on examination today and his EKG showed frequent PACs.  However, poor anterior R wave progression with a possible prior anterior infarction was noted on his EKG which was not mentioned on his stress test in 2009.  At this time, I feel he should be further evaluated with a stress echocardiogram.  He will follow-up in a couple of months, after that procedure.  He is leaving for vacation for several weeks.  Lipid status and blood pressure appear to be under good control and he is asymptomatic from a cardiovascular standpoint.  He is exercising regularly without difficulty.

## 2019-03-29 LAB — EKG IMPRESSION: NORMAL

## 2019-04-23 ENCOUNTER — HOSPITAL ENCOUNTER (OUTPATIENT)
Dept: CARDIOLOGY | Facility: MEDICAL CENTER | Age: 74
End: 2019-04-23
Attending: INTERNAL MEDICINE
Payer: MEDICARE

## 2019-04-23 DIAGNOSIS — I25.10 CORONARY ARTERY DISEASE DUE TO CALCIFIED CORONARY LESION: ICD-10-CM

## 2019-04-23 DIAGNOSIS — R94.31 NONSPECIFIC ABNORMAL ELECTROCARDIOGRAM (ECG) (EKG): ICD-10-CM

## 2019-04-23 DIAGNOSIS — I25.84 CORONARY ARTERY DISEASE DUE TO CALCIFIED CORONARY LESION: ICD-10-CM

## 2019-04-23 LAB — LV EJECT FRACT  99904: 65

## 2019-04-23 PROCEDURE — 93350 STRESS TTE ONLY: CPT | Mod: 26 | Performed by: INTERNAL MEDICINE

## 2019-04-23 PROCEDURE — 93018 CV STRESS TEST I&R ONLY: CPT | Performed by: INTERNAL MEDICINE

## 2019-04-23 PROCEDURE — 93017 CV STRESS TEST TRACING ONLY: CPT

## 2019-05-12 DIAGNOSIS — I10 ESSENTIAL HYPERTENSION: ICD-10-CM

## 2019-05-14 RX ORDER — LISINOPRIL 5 MG/1
TABLET ORAL
Qty: 90 TAB | Refills: 3 | Status: SHIPPED | OUTPATIENT
Start: 2019-05-14 | End: 2020-05-04 | Stop reason: SDUPTHER

## 2019-05-27 DIAGNOSIS — E78.5 DYSLIPIDEMIA: ICD-10-CM

## 2019-05-28 ENCOUNTER — OFFICE VISIT (OUTPATIENT)
Dept: CARDIOLOGY | Facility: MEDICAL CENTER | Age: 74
End: 2019-05-28
Payer: MEDICARE

## 2019-05-28 VITALS
DIASTOLIC BLOOD PRESSURE: 80 MMHG | HEART RATE: 68 BPM | WEIGHT: 185.1 LBS | SYSTOLIC BLOOD PRESSURE: 128 MMHG | BODY MASS INDEX: 28.05 KG/M2 | OXYGEN SATURATION: 96 % | HEIGHT: 68 IN

## 2019-05-28 DIAGNOSIS — I25.84 CORONARY ARTERY DISEASE DUE TO CALCIFIED CORONARY LESION: ICD-10-CM

## 2019-05-28 DIAGNOSIS — I25.10 CORONARY ARTERY DISEASE DUE TO CALCIFIED CORONARY LESION: ICD-10-CM

## 2019-05-28 DIAGNOSIS — I10 ESSENTIAL HYPERTENSION, BENIGN: ICD-10-CM

## 2019-05-28 DIAGNOSIS — E78.5 DYSLIPIDEMIA: ICD-10-CM

## 2019-05-28 PROCEDURE — 99214 OFFICE O/P EST MOD 30 MIN: CPT | Performed by: INTERNAL MEDICINE

## 2019-05-28 NOTE — PROGRESS NOTES
Chief Complaint   Patient presents with   • Coronary Artery Disease       Subjective:   Jimenez Villalba is a 73 y.o. male who presents today for followup of his coronary disease with a mildly positive coronary calcium score, hyperlipidemia and elevated blood pressure. His echocardiogram showed moderate concentric left ventricular hypertrophy.    He denies any chest discomfort, dyspnea, edema, palpitations or lightheadedness.  He is walking 3 to 4 days a week for about an hour but is not dyspneic with this.    His blood pressures at home of been running about 120/70    Past Medical History:   Diagnosis Date   • Abdominal pain, other specified site 8/23/2013   • Abnormal myocardial perfusion study 12/21/2011   • CAD (coronary artery disease) 12/21/2011   • Elevated blood pressure 5/20/2014   • Essential hypertension, benign 7/28/2015   • Hyperlipidemia 12/21/2011   • Hypertensive urgency 8/23/2013   • Pericarditis      History reviewed. No pertinent surgical history.  Family History   Problem Relation Age of Onset   • Other Mother         lung ca   • Heart Attack Neg Hx      Social History     Social History   • Marital status:      Spouse name: N/A   • Number of children: N/A   • Years of education: N/A     Occupational History   • Not on file.     Social History Main Topics   • Smoking status: Former Smoker     Packs/day: 1.00     Years: 10.00     Quit date: 12/21/1976   • Smokeless tobacco: Former User      Comment: 31 years ago   • Alcohol use 10.0 oz/week     20 Glasses of wine per week      Comment: daily   • Drug use: Unknown   • Sexual activity: Not on file     Other Topics Concern   • Not on file     Social History Narrative   • No narrative on file     Allergies   Allergen Reactions   • Nkda [No Known Drug Allergy]      Outpatient Encounter Prescriptions as of 5/28/2019   Medication Sig Dispense Refill   • lisinopril (PRINIVIL) 5 MG Tab TAKE 1 TABLET BY MOUTH EVERY DAY 90 Tab 3   • Multiple  "Vitamins-Minerals (PRESERVISION AREDS 2 PO) Take  by mouth.     • metoprolol SR (TOPROL XL) 25 MG TABLET SR 24 HR TAKE 1/2 TABLET BY MOUTH EVERY DAY 45 Tab 3   • atorvastatin (LIPITOR) 40 MG Tab TAKE 1 TABLET BY MOUTH EVERY DAY 90 Tab 3   • Cholecalciferol (VITAMIN D) 400 UNIT Tab Take 400 Units by mouth every day.     • Omega-3 Fatty Acids (FISH OIL) 1000 MG Cap capsule Take 1,000 mg by mouth 3 times a day, with meals.     • cyanocobalamin (VITAMIN B-12) 100 MCG Tab Take 100 mcg by mouth every day.     • Calcium-Vitamin D-Vitamin K (CALCIUM + D + K PO) Take  by mouth.     • Saw Palmetto, Serenoa repens, (SAW PALMETTO PO) Take  by mouth.     • aspirin 81 MG tablet Take 81 mg by mouth every day.     • FLUZONE HIGH-DOSE 0.5 ML Suspension Prefilled Syringe injection      • Multiple Vitamin (MULTI VITAMIN DAILY PO) Take  by mouth.     • Multiple Vitamins-Minerals (OCUVITE PO) Take  by mouth.       No facility-administered encounter medications on file as of 5/28/2019.      ROS     Objective:   /80 (BP Location: Left arm, Patient Position: Sitting, BP Cuff Size: Adult)   Pulse 68   Ht 1.727 m (5' 8\")   Wt 84 kg (185 lb 1.6 oz)   SpO2 96%   BMI 28.14 kg/m²     Physical Exam   Constitutional: He appears well-developed and well-nourished.   Neck: No JVD present.   Cardiovascular: Normal rate and regular rhythm.    No murmur heard.  Pulmonary/Chest: Effort normal and breath sounds normal. He has no rales.   Abdominal: Soft. There is no tenderness.   Musculoskeletal: He exhibits no edema.     Lab Results   Component Value Date/Time    CHOLSTRLTOT 150 03/15/2019 03:08 PM    CHOLSTRLTOT 150 04/26/2013 09:20 AM    LDL 66 03/15/2019 03:08 PM    LDL 58 04/26/2013 09:20 AM    HDL 72 03/15/2019 03:08 PM    HDL 72 04/26/2013 09:20 AM    TRIGLYCERIDE 62 03/15/2019 03:08 PM    TRIGLYCERIDE 98 04/26/2013 09:20 AM       Lab Results   Component Value Date/Time    SODIUM 146 (H) 03/15/2019 03:08 PM    SODIUM 136 08/24/2013 05:30 " AM    POTASSIUM 4.4 03/15/2019 03:08 PM    POTASSIUM 4.1 2013 05:30 AM    CHLORIDE 107 (H) 03/15/2019 03:08 PM    CHLORIDE 104 2013 05:30 AM    CO2 23 03/15/2019 03:08 PM    CO2 27 2013 05:30 AM    GLUCOSE 86 03/15/2019 03:08 PM    GLUCOSE 101 (H) 2013 05:30 AM    BUN 9 03/15/2019 03:08 PM    BUN 9 2013 05:30 AM    CREATININE 0.67 (L) 03/15/2019 03:08 PM    CREATININE 0.89 2013 05:30 AM    BUNCREATRAT 13 03/15/2019 03:08 PM     Lab Results   Component Value Date/Time    ALKPHOSPHAT 57 03/15/2019 03:08 PM    ALKPHOSPHAT 37 2013 05:30 AM    ASTSGOT 26 03/15/2019 03:08 PM    ASTSGOT 20 2013 05:30 AM    ALTSGPT 23 03/15/2019 03:08 PM    ALTSGPT 22 2013 05:30 AM    TBILIRUBIN 0.6 03/15/2019 03:08 PM    TBILIRUBIN 1.1 2013 05:30 AM      Lab Results   Component Value Date/Time    BNPBTYPENAT 24 10/29/2009 12:25 PM          Echocardiography Laboratory  CONCLUSIONS  Negative stress echocardiogram for ischemia with adequate stress   achieved by heart rate criteria.     ERNESTINA POPE  Age:    73    Gender:    M  MRN:    [id number]  :    1945  Exam Date:           2019       Assessment:     1. Coronary artery disease due to calcified coronary lesion: Coronary calcification seen on CT     2. Dyslipidemia     3. Essential hypertension, benign         Medical Decision Making:  Today's Assessment / Status / Plan:     Mr. Pope is clinically stable.  His blood pressure and lipid status appear to be under good control.  He will follow-up in about a year with repeat lab work.  His exercise tolerance was not optimal on his stress echo.  We did discuss a better aerobic conditioning program.

## 2019-05-28 NOTE — LETTER
[facility], formerly [facility]   [address]  [place] [address]  Phone: [phone]  Fax: [phone]              Jimenez Villalba  1945    Encounter Date: 5/28/2019    Jose L Mcdonald M.D.          PROGRESS NOTE:  Chief Complaint   Patient presents with   • Coronary Artery Disease       Subjective:   Jimenez Villalba is a 73 y.o. male who presents today for followup of his coronary disease with a mildly positive coronary calcium score, hyperlipidemia and elevated blood pressure. His echocardiogram showed moderate concentric left ventricular hypertrophy.    He denies any chest discomfort, dyspnea, edema, palpitations or lightheadedness.  He is walking 3 to 4 days a week for about an hour but is not dyspneic with this.    His blood pressures at home of been running about 120/70    Past Medical History:   Diagnosis Date   • Abdominal pain, other specified site 8/23/2013   • Abnormal myocardial perfusion study 12/21/2011   • CAD (coronary artery disease) 12/21/2011   • Elevated blood pressure 5/20/2014   • Essential hypertension, benign 7/28/2015   • Hyperlipidemia 12/21/2011   • Hypertensive urgency 8/23/2013   • Pericarditis      History reviewed. No pertinent surgical history.  Family History   Problem Relation Age of Onset   • Other Mother         lung ca   • Heart Attack Neg Hx      Social History     Social History   • Marital status:      Spouse name: N/A   • Number of children: N/A   • Years of education: N/A     Occupational History   • Not on file.     Social History Main Topics   • Smoking status: Former Smoker     Packs/day: 1.00     Years: 10.00     Quit date: 12/21/1976   • Smokeless tobacco: Former User      Comment: 31 years ago   • Alcohol use 10.0 oz/week     20 Glasses of wine per week      Comment: daily   • Drug use: Unknown   • Sexual activity: Not on file     Other Topics Concern   • Not on file     Social History Narrative   • No narrative  "on file     Allergies   Allergen Reactions   • Nkda [No Known Drug Allergy]      Outpatient Encounter Prescriptions as of 5/28/2019   Medication Sig Dispense Refill   • lisinopril (PRINIVIL) 5 MG Tab TAKE 1 TABLET BY MOUTH EVERY DAY 90 Tab 3   • Multiple Vitamins-Minerals (PRESERVISION AREDS 2 PO) Take  by mouth.     • metoprolol SR (TOPROL XL) 25 MG TABLET SR 24 HR TAKE 1/2 TABLET BY MOUTH EVERY DAY 45 Tab 3   • atorvastatin (LIPITOR) 40 MG Tab TAKE 1 TABLET BY MOUTH EVERY DAY 90 Tab 3   • Cholecalciferol (VITAMIN D) 400 UNIT Tab Take 400 Units by mouth every day.     • Omega-3 Fatty Acids (FISH OIL) 1000 MG Cap capsule Take 1,000 mg by mouth 3 times a day, with meals.     • cyanocobalamin (VITAMIN B-12) 100 MCG Tab Take 100 mcg by mouth every day.     • Calcium-Vitamin D-Vitamin K (CALCIUM + D + K PO) Take  by mouth.     • Saw Palmetto, Serenoa repens, (SAW PALMETTO PO) Take  by mouth.     • aspirin 81 MG tablet Take 81 mg by mouth every day.     • FLUZONE HIGH-DOSE 0.5 ML Suspension Prefilled Syringe injection      • Multiple Vitamin (MULTI VITAMIN DAILY PO) Take  by mouth.     • Multiple Vitamins-Minerals (OCUVITE PO) Take  by mouth.       No facility-administered encounter medications on file as of 5/28/2019.      ROS     Objective:   /80 (BP Location: Left arm, Patient Position: Sitting, BP Cuff Size: Adult)   Pulse 68   Ht 1.727 m (5' 8\")   Wt 84 kg (185 lb 1.6 oz)   SpO2 96%   BMI 28.14 kg/m²      Physical Exam   Constitutional: He appears well-developed and well-nourished.   Neck: No JVD present.   Cardiovascular: Normal rate and regular rhythm.    No murmur heard.  Pulmonary/Chest: Effort normal and breath sounds normal. He has no rales.   Abdominal: Soft. There is no tenderness.   Musculoskeletal: He exhibits no edema.     Lab Results   Component Value Date/Time    CHOLSTRLTOT 150 03/15/2019 03:08 PM    CHOLSTRLTOT 150 04/26/2013 09:20 AM    LDL 66 03/15/2019 03:08 PM    LDL 58 04/26/2013 09:20 " AM    HDL 72 03/15/2019 03:08 PM    HDL 72 2013 09:20 AM    TRIGLYCERIDE 62 03/15/2019 03:08 PM    TRIGLYCERIDE 98 2013 09:20 AM       Lab Results   Component Value Date/Time    SODIUM 146 (H) 03/15/2019 03:08 PM    SODIUM 136 2013 05:30 AM    POTASSIUM 4.4 03/15/2019 03:08 PM    POTASSIUM 4.1 2013 05:30 AM    CHLORIDE 107 (H) 03/15/2019 03:08 PM    CHLORIDE 104 2013 05:30 AM    CO2 23 03/15/2019 03:08 PM    CO2 27 2013 05:30 AM    GLUCOSE 86 03/15/2019 03:08 PM    GLUCOSE 101 (H) 2013 05:30 AM    BUN 9 03/15/2019 03:08 PM    BUN 9 2013 05:30 AM    CREATININE 0.67 (L) 03/15/2019 03:08 PM    CREATININE 0.89 2013 05:30 AM    BUNCREATRAT 13 03/15/2019 03:08 PM     Lab Results   Component Value Date/Time    ALKPHOSPHAT 57 03/15/2019 03:08 PM    ALKPHOSPHAT 37 2013 05:30 AM    ASTSGOT 26 03/15/2019 03:08 PM    ASTSGOT 20 2013 05:30 AM    ALTSGPT 23 03/15/2019 03:08 PM    ALTSGPT 22 2013 05:30 AM    TBILIRUBIN 0.6 03/15/2019 03:08 PM    TBILIRUBIN 1.1 2013 05:30 AM      Lab Results   Component Value Date/Time    BNPBTYPENAT 24 10/29/2009 12:25 PM          Echocardiography Laboratory  CONCLUSIONS  Negative stress echocardiogram for ischemia with adequate stress   achieved by heart rate criteria.     ERNESTINA POPE  Age:    73    Gender:    M  MRN:    [id number]  :    1945  Exam Date:           2019       Assessment:     1. Coronary artery disease due to calcified coronary lesion: Coronary calcification seen on CT     2. Dyslipidemia     3. Essential hypertension, benign         Medical Decision Making:  Today's Assessment / Status / Plan:     Mr. Pope is clinically stable.  His blood pressure and lipid status appear to be under good control.  He will follow-up in about a year with repeat lab work.  His exercise tolerance was not optimal on his stress echo.  We did discuss a better aerobic conditioning program.      Ashley RODARTE  SUNNY Ayala  [address] [place] [id number]  VIA Facsimile: [phone]

## 2019-05-29 RX ORDER — ATORVASTATIN CALCIUM 40 MG/1
TABLET, FILM COATED ORAL
Qty: 90 TAB | Refills: 3 | Status: SHIPPED | OUTPATIENT
Start: 2019-05-29 | End: 2020-05-04 | Stop reason: SDUPTHER

## 2019-06-18 DIAGNOSIS — I25.84 CORONARY ARTERY DISEASE DUE TO CALCIFIED CORONARY LESION: ICD-10-CM

## 2019-06-18 DIAGNOSIS — I25.10 CORONARY ARTERY DISEASE DUE TO CALCIFIED CORONARY LESION: ICD-10-CM

## 2019-06-19 RX ORDER — METOPROLOL SUCCINATE 25 MG/1
TABLET, EXTENDED RELEASE ORAL
Qty: 45 TAB | Refills: 3 | Status: SHIPPED | OUTPATIENT
Start: 2019-06-19 | End: 2020-06-23 | Stop reason: SDUPTHER

## 2020-05-04 DIAGNOSIS — E78.5 DYSLIPIDEMIA: ICD-10-CM

## 2020-05-04 DIAGNOSIS — I10 ESSENTIAL HYPERTENSION: ICD-10-CM

## 2020-05-05 RX ORDER — ATORVASTATIN CALCIUM 40 MG/1
40 TABLET, FILM COATED ORAL
Qty: 90 TAB | Refills: 0 | Status: SHIPPED | OUTPATIENT
Start: 2020-05-05 | End: 2020-07-27 | Stop reason: SDUPTHER

## 2020-05-05 RX ORDER — LISINOPRIL 5 MG/1
5 TABLET ORAL
Qty: 90 TAB | Refills: 0 | Status: SHIPPED | OUTPATIENT
Start: 2020-05-05 | End: 2020-07-27 | Stop reason: SDUPTHER

## 2020-05-27 LAB
ALBUMIN SERPL-MCNC: 4.7 G/DL (ref 3.7–4.7)
ALBUMIN/GLOB SERPL: 2.4 {RATIO} (ref 1.2–2.2)
ALP SERPL-CCNC: 64 IU/L (ref 39–117)
ALT SERPL-CCNC: 28 IU/L (ref 0–44)
AST SERPL-CCNC: 26 IU/L (ref 0–40)
BILIRUB SERPL-MCNC: 0.9 MG/DL (ref 0–1.2)
BUN SERPL-MCNC: 14 MG/DL (ref 8–27)
BUN/CREAT SERPL: 16 (ref 10–24)
CALCIUM SERPL-MCNC: 9.5 MG/DL (ref 8.6–10.2)
CHLORIDE SERPL-SCNC: 104 MMOL/L (ref 96–106)
CHOLEST SERPL-MCNC: 152 MG/DL (ref 100–199)
CO2 SERPL-SCNC: 22 MMOL/L (ref 20–29)
CREAT SERPL-MCNC: 0.88 MG/DL (ref 0.76–1.27)
GLOBULIN SER CALC-MCNC: 2 G/DL (ref 1.5–4.5)
GLUCOSE SERPL-MCNC: 89 MG/DL (ref 65–99)
HDLC SERPL-MCNC: 71 MG/DL
LABORATORY COMMENT REPORT: NORMAL
LDLC SERPL CALC-MCNC: 72 MG/DL (ref 0–99)
POTASSIUM SERPL-SCNC: 5 MMOL/L (ref 3.5–5.2)
PROT SERPL-MCNC: 6.7 G/DL (ref 6–8.5)
SODIUM SERPL-SCNC: 141 MMOL/L (ref 134–144)
TRIGL SERPL-MCNC: 47 MG/DL (ref 0–149)
VLDLC SERPL CALC-MCNC: 9 MG/DL (ref 5–40)

## 2020-06-23 DIAGNOSIS — I25.10 CORONARY ARTERY DISEASE DUE TO CALCIFIED CORONARY LESION: ICD-10-CM

## 2020-06-23 DIAGNOSIS — I25.84 CORONARY ARTERY DISEASE DUE TO CALCIFIED CORONARY LESION: ICD-10-CM

## 2020-06-24 RX ORDER — METOPROLOL SUCCINATE 25 MG/1
12.5 TABLET, EXTENDED RELEASE ORAL DAILY
Qty: 45 TAB | Refills: 0 | Status: SHIPPED | OUTPATIENT
Start: 2020-06-24 | End: 2020-07-27

## 2020-07-27 ENCOUNTER — OFFICE VISIT (OUTPATIENT)
Dept: CARDIOLOGY | Facility: MEDICAL CENTER | Age: 75
End: 2020-07-27
Payer: MEDICARE

## 2020-07-27 VITALS
HEART RATE: 66 BPM | WEIGHT: 175 LBS | BODY MASS INDEX: 26.52 KG/M2 | OXYGEN SATURATION: 95 % | HEIGHT: 68 IN | SYSTOLIC BLOOD PRESSURE: 128 MMHG | DIASTOLIC BLOOD PRESSURE: 88 MMHG

## 2020-07-27 DIAGNOSIS — I10 ESSENTIAL HYPERTENSION, BENIGN: ICD-10-CM

## 2020-07-27 DIAGNOSIS — I10 ESSENTIAL HYPERTENSION: ICD-10-CM

## 2020-07-27 DIAGNOSIS — I25.10 CORONARY ARTERY DISEASE DUE TO CALCIFIED CORONARY LESION: ICD-10-CM

## 2020-07-27 DIAGNOSIS — I25.84 CORONARY ARTERY DISEASE DUE TO CALCIFIED CORONARY LESION: ICD-10-CM

## 2020-07-27 DIAGNOSIS — E78.5 DYSLIPIDEMIA: ICD-10-CM

## 2020-07-27 PROCEDURE — 99214 OFFICE O/P EST MOD 30 MIN: CPT | Performed by: INTERNAL MEDICINE

## 2020-07-27 RX ORDER — LISINOPRIL 5 MG/1
5 TABLET ORAL
Qty: 90 TAB | Refills: 3 | Status: SHIPPED | OUTPATIENT
Start: 2020-07-27 | End: 2021-08-20

## 2020-07-27 RX ORDER — ATORVASTATIN CALCIUM 40 MG/1
40 TABLET, FILM COATED ORAL
Qty: 90 TAB | Refills: 3 | Status: SHIPPED | OUTPATIENT
Start: 2020-07-27 | End: 2021-08-26

## 2020-07-27 ASSESSMENT — ENCOUNTER SYMPTOMS
SHORTNESS OF BREATH: 0
WHEEZING: 0
FEVER: 0
BRUISES/BLEEDS EASILY: 0
GASTROINTESTINAL NEGATIVE: 1
MUSCULOSKELETAL NEGATIVE: 1
LOSS OF CONSCIOUSNESS: 0
HEMOPTYSIS: 0
CARDIOVASCULAR NEGATIVE: 1
DIZZINESS: 0
CONSTITUTIONAL NEGATIVE: 1
CHILLS: 0
COUGH: 0
PND: 0
ORTHOPNEA: 0
EYES NEGATIVE: 1
PALPITATIONS: 0
WEAKNESS: 0
CLAUDICATION: 0
RESPIRATORY NEGATIVE: 1
NEUROLOGICAL NEGATIVE: 1
STRIDOR: 0
SORE THROAT: 0
SPUTUM PRODUCTION: 0

## 2020-07-27 NOTE — PROGRESS NOTES
Chief Complaint   Patient presents with   • Coronary Artery Disease       Subjective:   MARA Villalba is a 75 y.o. male who presents today as a follow-up from prior provider for history of an abnormal calcium score hypertension hyperlipidemia.  Since he was last seen he continues to feel well.  He has blood pressure control.  Has no chest pain palpitations or PND.  He is functionally active.  He is compliant with his atorvastatin.  His last LDL was 72.    Past Medical History:   Diagnosis Date   • Abdominal pain, other specified site 2013   • Abnormal myocardial perfusion study 2011   • CAD (coronary artery disease) 2011   • Elevated blood pressure 2014   • Essential hypertension, benign 2015   • Hyperlipidemia 2011   • Hypertensive urgency 2013   • Pericarditis      No past surgical history on file.  Family History   Problem Relation Age of Onset   • Other Mother         lung ca   • Heart Attack Neg Hx      Social History     Socioeconomic History   • Marital status:      Spouse name: Not on file   • Number of children: Not on file   • Years of education: Not on file   • Highest education level: Not on file   Occupational History   • Not on file   Social Needs   • Financial resource strain: Not on file   • Food insecurity     Worry: Not on file     Inability: Not on file   • Transportation needs     Medical: Not on file     Non-medical: Not on file   Tobacco Use   • Smoking status: Former Smoker     Packs/day: 1.00     Years: 10.00     Pack years: 10.00     Last attempt to quit: 1976     Years since quittin.6   • Smokeless tobacco: Former User   • Tobacco comment: 31 years ago   Substance and Sexual Activity   • Alcohol use: Yes     Alcohol/week: 10.0 oz     Types: 20 Glasses of wine per week     Comment: daily   • Drug use: Not on file   • Sexual activity: Not on file   Lifestyle   • Physical activity     Days per week: Not on file     Minutes per session:  Not on file   • Stress: Not on file   Relationships   • Social connections     Talks on phone: Not on file     Gets together: Not on file     Attends Scientologist service: Not on file     Active member of club or organization: Not on file     Attends meetings of clubs or organizations: Not on file     Relationship status: Not on file   • Intimate partner violence     Fear of current or ex partner: Not on file     Emotionally abused: Not on file     Physically abused: Not on file     Forced sexual activity: Not on file   Other Topics Concern   • Not on file   Social History Narrative   • Not on file     Allergies   Allergen Reactions   • Nkda [No Known Drug Allergy]      Outpatient Encounter Medications as of 7/27/2020   Medication Sig Dispense Refill   • atorvastatin (LIPITOR) 40 MG Tab Take 1 Tab by mouth every day. 90 Tab 3   • lisinopril (PRINIVIL) 5 MG Tab Take 1 Tab by mouth every day. 90 Tab 3   • Multiple Vitamins-Minerals (PRESERVISION AREDS 2 PO) Take  by mouth.     • Multiple Vitamin (MULTI VITAMIN DAILY PO) Take  by mouth.     • Cholecalciferol (VITAMIN D) 400 UNIT Tab Take 400 Units by mouth every day.     • Omega-3 Fatty Acids (FISH OIL) 1000 MG Cap capsule Take 1,000 mg by mouth 3 times a day, with meals.     • cyanocobalamin (VITAMIN B-12) 100 MCG Tab Take 100 mcg by mouth every day.     • Calcium-Vitamin D-Vitamin K (CALCIUM + D + K PO) Take  by mouth.     • Saw Palmetto, Serenoa repens, (SAW PALMETTO PO) Take  by mouth.     • aspirin 81 MG tablet Take 81 mg by mouth every day.     • [DISCONTINUED] metoprolol SR (TOPROL XL) 25 MG TABLET SR 24 HR Take 0.5 Tabs by mouth every day. 45 Tab 0   • [DISCONTINUED] lisinopril (PRINIVIL) 5 MG Tab Take 1 Tab by mouth every day. For further refills please contact new cardiologist. Thank you 90 Tab 0   • [DISCONTINUED] atorvastatin (LIPITOR) 40 MG Tab Take 1 Tab by mouth every day. For further refills please contact new cardiologist. Thank you 90 Tab 0   • FLUZONE  "HIGH-DOSE 0.5 ML Suspension Prefilled Syringe injection      • Multiple Vitamins-Minerals (OCUVITE PO) Take  by mouth.       No facility-administered encounter medications on file as of 7/27/2020.      Review of Systems   Constitutional: Negative.  Negative for chills, fever and malaise/fatigue.   HENT: Negative.  Negative for sore throat.    Eyes: Negative.    Respiratory: Negative.  Negative for cough, hemoptysis, sputum production, shortness of breath, wheezing and stridor.    Cardiovascular: Negative.  Negative for chest pain, palpitations, orthopnea, claudication, leg swelling and PND.   Gastrointestinal: Negative.    Genitourinary: Negative.    Musculoskeletal: Negative.    Skin: Negative.    Neurological: Negative.  Negative for dizziness, loss of consciousness and weakness.   Endo/Heme/Allergies: Negative.  Does not bruise/bleed easily.   All other systems reviewed and are negative.       Objective:   /88 (BP Location: Left arm, Patient Position: Sitting)   Pulse 66   Ht 1.727 m (5' 8\")   Wt 79.4 kg (175 lb)   SpO2 95%   BMI 26.61 kg/m²     Physical Exam   Constitutional: He appears well-developed and well-nourished. No distress.   HENT:   Head: Normocephalic and atraumatic.   Right Ear: External ear normal.   Left Ear: External ear normal.   Nose: Nose normal.   Mouth/Throat: No oropharyngeal exudate.   Eyes: Pupils are equal, round, and reactive to light. Conjunctivae and EOM are normal. Right eye exhibits no discharge. Left eye exhibits no discharge. No scleral icterus.   Neck: Neck supple. No JVD present.   Cardiovascular: Normal rate, regular rhythm and intact distal pulses. Exam reveals no gallop and no friction rub.   No murmur heard.  Pulmonary/Chest: Effort normal. No stridor. No respiratory distress. He has no wheezes. He has no rales. He exhibits no tenderness.   Abdominal: Soft. He exhibits no distension. There is no guarding.   Musculoskeletal: Normal range of motion.         General: " No tenderness, deformity or edema.   Neurological: He is alert. He has normal reflexes. He displays normal reflexes. No cranial nerve deficit. He exhibits normal muscle tone. Coordination normal.   Skin: Skin is warm and dry. No rash noted. He is not diaphoretic. No erythema. No pallor.   Psychiatric: He has a normal mood and affect. His behavior is normal. Judgment and thought content normal.   Nursing note and vitals reviewed.      Assessment:     1. Dyslipidemia  atorvastatin (LIPITOR) 40 MG Tab   2. Essential hypertension, benign     3. Coronary artery disease due to calcified coronary lesion: Coronary calcification seen on CT     4. Essential hypertension  lisinopril (PRINIVIL) 5 MG Tab       Medical Decision Making:  Today's Assessment / Status / Plan:     75-year-old male with hypertension hyperlipidemia and an abnormal calcium score.  At this point we will keep on his atorvastatin.  We refilled his metoprolol lisinopril.  He is doing well with no functional notations therefore we will not pursue no further work-up.  We will see him back in 1 year.

## 2021-01-15 DIAGNOSIS — Z23 NEED FOR VACCINATION: ICD-10-CM

## 2021-08-20 DIAGNOSIS — I10 ESSENTIAL HYPERTENSION: ICD-10-CM

## 2021-08-20 RX ORDER — LISINOPRIL 5 MG/1
TABLET ORAL
Qty: 90 TABLET | Refills: 0 | Status: SHIPPED | OUTPATIENT
Start: 2021-08-20 | End: 2021-10-13 | Stop reason: SDUPTHER

## 2021-08-26 DIAGNOSIS — E78.5 DYSLIPIDEMIA: ICD-10-CM

## 2021-08-26 RX ORDER — ATORVASTATIN CALCIUM 40 MG/1
40 TABLET, FILM COATED ORAL DAILY
Qty: 30 TABLET | Refills: 0 | Status: SHIPPED | OUTPATIENT
Start: 2021-08-26 | End: 2021-10-01

## 2021-08-26 NOTE — LETTER
August 26, 2021        Jimenez Villalba  [address] [place] [address]        Dear Jimenez:        We received a medication refill request, and it looks like you're due for an annual visit and annual labs. In order to continue filling your medications safely, please call [phone] to schedule a visit with Ronnie Wilson M.D. and have labs drawn 1-2 weeks prior to the appointment.     Let us know if you have any questions.    Thank you,  Jamia HIDALGO              Electronically Signed

## 2021-08-27 ENCOUNTER — TELEPHONE (OUTPATIENT)
Dept: CARDIOLOGY | Facility: MEDICAL CENTER | Age: 76
End: 2021-08-27

## 2021-08-27 NOTE — TELEPHONE ENCOUNTER
STEVEN Tolbert called and is requesting a lab slip be mailed to address on file. Please call pt back if you have any further questions at [phone].       Thank you.

## 2021-10-02 LAB
CHOLEST SERPL-MCNC: 140 MG/DL (ref 100–199)
HDLC SERPL-MCNC: 70 MG/DL
LABORATORY COMMENT REPORT: NORMAL
LDLC SERPL CALC-MCNC: 59 MG/DL (ref 0–99)
TRIGL SERPL-MCNC: 47 MG/DL (ref 0–149)
VLDLC SERPL CALC-MCNC: 11 MG/DL (ref 5–40)

## 2021-10-13 ENCOUNTER — OFFICE VISIT (OUTPATIENT)
Dept: CARDIOLOGY | Facility: MEDICAL CENTER | Age: 76
End: 2021-10-13
Payer: MEDICARE

## 2021-10-13 VITALS
HEIGHT: 68 IN | OXYGEN SATURATION: 97 % | HEART RATE: 67 BPM | DIASTOLIC BLOOD PRESSURE: 86 MMHG | SYSTOLIC BLOOD PRESSURE: 138 MMHG | RESPIRATION RATE: 16 BRPM | BODY MASS INDEX: 26.98 KG/M2 | WEIGHT: 178 LBS

## 2021-10-13 DIAGNOSIS — I25.10 CORONARY ARTERY DISEASE DUE TO CALCIFIED CORONARY LESION: ICD-10-CM

## 2021-10-13 DIAGNOSIS — I25.84 CORONARY ARTERY DISEASE DUE TO CALCIFIED CORONARY LESION: ICD-10-CM

## 2021-10-13 DIAGNOSIS — I10 ESSENTIAL HYPERTENSION: ICD-10-CM

## 2021-10-13 DIAGNOSIS — I10 ESSENTIAL HYPERTENSION, BENIGN: ICD-10-CM

## 2021-10-13 DIAGNOSIS — R94.39 ABNORMAL STRESS TEST: ICD-10-CM

## 2021-10-13 DIAGNOSIS — E78.5 DYSLIPIDEMIA: ICD-10-CM

## 2021-10-13 PROCEDURE — 99214 OFFICE O/P EST MOD 30 MIN: CPT | Performed by: INTERNAL MEDICINE

## 2021-10-13 RX ORDER — LISINOPRIL 5 MG/1
5 TABLET ORAL DAILY
Qty: 90 TABLET | Refills: 3 | Status: SHIPPED | OUTPATIENT
Start: 2021-10-13 | End: 2022-08-31 | Stop reason: SDUPTHER

## 2021-10-13 RX ORDER — ATORVASTATIN CALCIUM 40 MG/1
40 TABLET, FILM COATED ORAL
Qty: 90 TABLET | Refills: 3 | Status: SHIPPED | OUTPATIENT
Start: 2021-10-13 | End: 2022-08-31 | Stop reason: SDUPTHER

## 2021-10-13 ASSESSMENT — ENCOUNTER SYMPTOMS
SORE THROAT: 0
RESPIRATORY NEGATIVE: 1
WHEEZING: 0
WEAKNESS: 0
PND: 0
SPUTUM PRODUCTION: 0
ORTHOPNEA: 0
SHORTNESS OF BREATH: 0
CLAUDICATION: 0
FEVER: 0
NEUROLOGICAL NEGATIVE: 1
PALPITATIONS: 0
LOSS OF CONSCIOUSNESS: 0
EYES NEGATIVE: 1
HEMOPTYSIS: 0
DIZZINESS: 0
CHILLS: 0
BRUISES/BLEEDS EASILY: 0
CARDIOVASCULAR NEGATIVE: 1
GASTROINTESTINAL NEGATIVE: 1
MUSCULOSKELETAL NEGATIVE: 1
STRIDOR: 0
CONSTITUTIONAL NEGATIVE: 1
COUGH: 0

## 2021-10-13 NOTE — PROGRESS NOTES
Chief Complaint   Patient presents with   • Dyslipidemia   • Coronary Artery Disease     F/V Dx: Coronary artery disease due to calcified coronary lesion: Coronary calcification seen on CT       Subjective:   MARA Villalba is a 75 y.o. male who presents today as a follow-up from prior provider for history of an abnormal calcium score hypertension hyperlipidemia.  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  He enjoys it.  He has no chest pain.  He is try to be active.  His last LDL was at goal.  He continues on his current medications with no issues.  His blood pressure is well controlled.    Past Medical History:   Diagnosis Date   • Abdominal pain, other specified site 2013   • Abnormal myocardial perfusion study 2011   • CAD (coronary artery disease) 2011   • Elevated blood pressure 2014   • Essential hypertension, benign 2015   • Hyperlipidemia 2011   • Hypertensive urgency 2013   • Pericarditis      History reviewed. No pertinent surgical history.  Family History   Problem Relation Age of Onset   • Other Mother         lung ca   • Heart Attack Neg Hx      Social History     Socioeconomic History   • Marital status:      Spouse name: Not on file   • Number of children: Not on file   • Years of education: Not on file   • Highest education level: Not on file   Occupational History   • Not on file   Tobacco Use   • Smoking status: Former Smoker     Packs/day: 1.00     Years: 10.00     Pack years: 10.00     Quit date: 1976     Years since quittin.8   • Smokeless tobacco: Former User   • Tobacco comment: 31 years ago   Vaping Use   • Vaping Use: Never assessed   Substance and Sexual Activity   • Alcohol use: Yes     Alcohol/week: 10.0 oz     Types: 20 Glasses of wine per week     Comment: 3-4 drinks daily   • Drug use: Not Currently   • Sexual activity: Not on file   Other Topics  Concern   • Not on file   Social History Narrative   • Not on file     Social Determinants of Health     Financial Resource Strain:    • Difficulty of Paying Living Expenses:    Food Insecurity:    • Worried About Running Out of Food in the Last Year:    • Ran Out of Food in the Last Year:    Transportation Needs:    • Lack of Transportation (Medical):    • Lack of Transportation (Non-Medical):    Physical Activity:    • Days of Exercise per Week:    • Minutes of Exercise per Session:    Stress:    • Feeling of Stress :    Social Connections:    • Frequency of Communication with Friends and Family:    • Frequency of Social Gatherings with Friends and Family:    • Attends Yazidi Services:    • Active Member of Clubs or Organizations:    • Attends Club or Organization Meetings:    • Marital Status:    Intimate Partner Violence:    • Fear of Current or Ex-Partner:    • Emotionally Abused:    • Physically Abused:    • Sexually Abused:      Allergies   Allergen Reactions   • Nkda [No Known Drug Allergy]      Outpatient Encounter Medications as of 10/13/2021   Medication Sig Dispense Refill   • atorvastatin (LIPITOR) 40 MG Tab Take 1 Tablet by mouth every day. 90 Tablet 3   • lisinopril (PRINIVIL) 5 MG Tab Take 1 Tablet by mouth every day. 90 Tablet 3   • Multiple Vitamins-Minerals (PRESERVISION AREDS 2 PO) Take  by mouth.     • Multiple Vitamin (MULTI VITAMIN DAILY PO) Take  by mouth.     • Cholecalciferol (VITAMIN D) 400 UNIT Tab Take 400 Units by mouth every day.     • Omega-3 Fatty Acids (FISH OIL) 1000 MG Cap capsule Take 1,000 mg by mouth 3 times a day, with meals.     • cyanocobalamin (VITAMIN B-12) 100 MCG Tab Take 100 mcg by mouth every day.     • Calcium-Vitamin D-Vitamin K (CALCIUM + D + K PO) Take  by mouth.     • Saw Palmetto, Serenoa repens, (SAW PALMETTO PO) Take  by mouth every day.     • aspirin 81 MG tablet Take 81 mg by mouth every day.     • [DISCONTINUED] atorvastatin (LIPITOR) 40 MG Tab TAKE 1  "TABLET BY MOUTH EVERY DAY 90 Tablet 0   • [DISCONTINUED] lisinopril (PRINIVIL) 5 MG Tab TAKE 1 TABLET BY MOUTH DAILY 90 Tablet 0   • [DISCONTINUED] FLUZONE HIGH-DOSE 0.5 ML Suspension Prefilled Syringe injection  (Patient not taking: Reported on 10/13/2021)     • [DISCONTINUED] Multiple Vitamins-Minerals (OCUVITE PO) Take  by mouth. (Patient not taking: Reported on 10/13/2021)       No facility-administered encounter medications on file as of 10/13/2021.     Review of Systems   Constitutional: Negative.  Negative for chills, fever and malaise/fatigue.   HENT: Negative.  Negative for sore throat.    Eyes: Negative.    Respiratory: Negative.  Negative for cough, hemoptysis, sputum production, shortness of breath, wheezing and stridor.    Cardiovascular: Negative.  Negative for chest pain, palpitations, orthopnea, claudication, leg swelling and PND.   Gastrointestinal: Negative.    Genitourinary: Negative.    Musculoskeletal: Negative.    Skin: Negative.    Neurological: Negative.  Negative for dizziness, loss of consciousness and weakness.   Endo/Heme/Allergies: Negative.  Does not bruise/bleed easily.   All other systems reviewed and are negative.       Objective:   /86 (BP Location: Left arm, Patient Position: Sitting, BP Cuff Size: Adult)   Pulse 67   Resp 16   Ht 1.727 m (5' 8\")   Wt 80.7 kg (178 lb)   SpO2 97%   BMI 27.06 kg/m²     Physical Exam  Vitals and nursing note reviewed.   Constitutional:       General: He is not in acute distress.     Appearance: He is well-developed. He is not diaphoretic.   HENT:      Head: Normocephalic and atraumatic.      Right Ear: External ear normal.      Left Ear: External ear normal.      Nose: Nose normal.      Mouth/Throat:      Pharynx: No oropharyngeal exudate.   Eyes:      General: No scleral icterus.        Right eye: No discharge.         Left eye: No discharge.      Conjunctiva/sclera: Conjunctivae normal.      Pupils: Pupils are equal, round, and reactive " to light.   Neck:      Vascular: No JVD.   Cardiovascular:      Rate and Rhythm: Normal rate and regular rhythm.      Heart sounds: No murmur heard.   No friction rub. No gallop.    Pulmonary:      Effort: Pulmonary effort is normal. No respiratory distress.      Breath sounds: No stridor. No wheezing or rales.   Chest:      Chest wall: No tenderness.   Abdominal:      General: There is no distension.      Palpations: Abdomen is soft.      Tenderness: There is no guarding.   Musculoskeletal:         General: No tenderness or deformity. Normal range of motion.      Cervical back: Neck supple.   Skin:     General: Skin is warm and dry.      Coloration: Skin is not pale.      Findings: No erythema or rash.   Neurological:      Mental Status: He is alert.      Cranial Nerves: No cranial nerve deficit.      Motor: No abnormal muscle tone.      Coordination: Coordination normal.      Deep Tendon Reflexes: Reflexes are normal and symmetric. Reflexes normal.   Psychiatric:         Behavior: Behavior normal.         Thought Content: Thought content normal.         Judgment: Judgment normal.       Lab Results   Component Value Date/Time    CHOLSTRLTOT 140 10/01/2021 11:53 AM    CHOLSTRLTOT 150 04/26/2013 09:20 AM    LDL 72 05/26/2020 11:56 AM    LDL 58 04/26/2013 09:20 AM    HDL 70 10/01/2021 11:53 AM    HDL 72 04/26/2013 09:20 AM    TRIGLYCERIDE 47 10/01/2021 11:53 AM    TRIGLYCERIDE 98 04/26/2013 09:20 AM       Lab Results   Component Value Date/Time    SODIUM 141 05/26/2020 11:56 AM    SODIUM 136 08/24/2013 05:30 AM    POTASSIUM 5.0 05/26/2020 11:56 AM    POTASSIUM 4.1 08/24/2013 05:30 AM    CHLORIDE 104 05/26/2020 11:56 AM    CHLORIDE 104 08/24/2013 05:30 AM    CO2 22 05/26/2020 11:56 AM    CO2 27 08/24/2013 05:30 AM    GLUCOSE 89 05/26/2020 11:56 AM    GLUCOSE 101 (H) 08/24/2013 05:30 AM    BUN 14 05/26/2020 11:56 AM    BUN 9 08/24/2013 05:30 AM    CREATININE 0.88 05/26/2020 11:56 AM    CREATININE 0.89 08/24/2013 05:30  AM    BUNCREATRAT 16 05/26/2020 11:56 AM     Lab Results   Component Value Date/Time    ALKPHOSPHAT 64 05/26/2020 11:56 AM    ALKPHOSPHAT 37 08/24/2013 05:30 AM    ASTSGOT 26 05/26/2020 11:56 AM    ASTSGOT 20 08/24/2013 05:30 AM    ALTSGPT 28 05/26/2020 11:56 AM    ALTSGPT 22 08/24/2013 05:30 AM    TBILIRUBIN 0.9 05/26/2020 11:56 AM    TBILIRUBIN 1.1 08/24/2013 05:30 AM        Assessment:     1. Essential hypertension, benign     2. Dyslipidemia  atorvastatin (LIPITOR) 40 MG Tab   3. Coronary artery disease due to calcified coronary lesion: Coronary calcification seen on CT     4. Abnormal stress test     5. Essential hypertension  lisinopril (PRINIVIL) 5 MG Tab       Medical Decision Making:  Today's Assessment / Status / Plan:     75-year-old male with hypertension hyperlipidemia and an abnormal calcium score.  At this point we will keep on his atorvastatin.  I refilled his medications today.  I reviewed his labs.  He is doing well.  We can see him back in 1 year.

## 2022-08-31 ENCOUNTER — OFFICE VISIT (OUTPATIENT)
Dept: CARDIOLOGY | Facility: MEDICAL CENTER | Age: 77
End: 2022-08-31
Payer: MEDICARE

## 2022-08-31 VITALS
BODY MASS INDEX: 25.46 KG/M2 | OXYGEN SATURATION: 96 % | DIASTOLIC BLOOD PRESSURE: 68 MMHG | SYSTOLIC BLOOD PRESSURE: 126 MMHG | WEIGHT: 168 LBS | HEART RATE: 65 BPM | RESPIRATION RATE: 14 BRPM | HEIGHT: 68 IN

## 2022-08-31 DIAGNOSIS — E78.5 DYSLIPIDEMIA: ICD-10-CM

## 2022-08-31 DIAGNOSIS — I10 ESSENTIAL HYPERTENSION: ICD-10-CM

## 2022-08-31 PROCEDURE — 99214 OFFICE O/P EST MOD 30 MIN: CPT | Performed by: INTERNAL MEDICINE

## 2022-08-31 RX ORDER — GUAIFENESIN/PHENYLPROPANOLAMIN
500 EXPECTORANT ORAL
COMMUNITY

## 2022-08-31 RX ORDER — ASPIRIN 81 MG/1
81 TABLET ORAL DAILY
COMMUNITY

## 2022-08-31 RX ORDER — MULTIVITAMIN,THERAPEUTIC
1 TABLET ORAL DAILY
COMMUNITY
End: 2022-08-31

## 2022-08-31 RX ORDER — LISINOPRIL 5 MG/1
5 TABLET ORAL DAILY
Qty: 90 TABLET | Refills: 3 | Status: SHIPPED | OUTPATIENT
Start: 2022-08-31 | End: 2023-11-30 | Stop reason: SDUPTHER

## 2022-08-31 RX ORDER — ATORVASTATIN CALCIUM 40 MG/1
40 TABLET, FILM COATED ORAL
Qty: 90 TABLET | Refills: 3 | Status: SHIPPED | OUTPATIENT
Start: 2022-08-31 | End: 2023-10-10 | Stop reason: SDUPTHER

## 2022-08-31 ASSESSMENT — ENCOUNTER SYMPTOMS
MUSCULOSKELETAL NEGATIVE: 1
CHILLS: 0
FEVER: 0
PALPITATIONS: 0
LOSS OF CONSCIOUSNESS: 0
SHORTNESS OF BREATH: 0
CONSTITUTIONAL NEGATIVE: 1
WHEEZING: 0
NEUROLOGICAL NEGATIVE: 1
HEMOPTYSIS: 0
WEAKNESS: 0
RESPIRATORY NEGATIVE: 1
CARDIOVASCULAR NEGATIVE: 1
SORE THROAT: 0
STRIDOR: 0
ORTHOPNEA: 0
PND: 0
BRUISES/BLEEDS EASILY: 0
SPUTUM PRODUCTION: 0
CLAUDICATION: 0
DIZZINESS: 0
EYES NEGATIVE: 1
GASTROINTESTINAL NEGATIVE: 1
COUGH: 0

## 2022-08-31 NOTE — PROGRESS NOTES
Chief Complaint   Patient presents with    HTN (Controlled)     F/V Dx: Essential hypertension, benign    Dyslipidemia       Subjective:   MARA Villalba is a 75 y.o. male who presents today as a follow-up from prior provider for history of an abnormal calcium score hypertension hyperlipidemia.      Since he was last seen, he is doing well. His BP is controlled, he is having no chest pain.  He walks 3-4 miles at a time 3 days per week.    Past Medical History:   Diagnosis Date    Abdominal pain, other specified site 2013    Abnormal myocardial perfusion study 2011    CAD (coronary artery disease) 2011    Elevated blood pressure 2014    Essential hypertension, benign 2015    Hyperlipidemia 2011    Hypertensive urgency 2013    Pericarditis      History reviewed. No pertinent surgical history.  Family History   Problem Relation Age of Onset    Other Mother         lung ca    Heart Attack Neg Hx      Social History     Socioeconomic History    Marital status:      Spouse name: Not on file    Number of children: Not on file    Years of education: Not on file    Highest education level: Not on file   Occupational History    Not on file   Tobacco Use    Smoking status: Former     Packs/day: 1.00     Years: 10.00     Pack years: 10.00     Types: Cigarettes     Quit date: 1976     Years since quittin.7    Smokeless tobacco: Former    Tobacco comments:     31 years ago   Vaping Use    Vaping Use: Not on file   Substance and Sexual Activity    Alcohol use: Yes     Alcohol/week: 12.6 - 16.8 oz     Types: 21 - 28 Glasses of wine per week     Comment: 3-4 drinks daily    Drug use: Not Currently    Sexual activity: Not on file   Other Topics Concern    Not on file   Social History Narrative    Not on file     Social Determinants of Health     Financial Resource Strain: Not on file   Food Insecurity: Not on file   Transportation Needs: Not on file   Physical Activity: Not on  file   Stress: Not on file   Social Connections: Not on file   Intimate Partner Violence: Not on file   Housing Stability: Not on file     Allergies   Allergen Reactions    Nkda [No Known Drug Allergy]      Outpatient Encounter Medications as of 8/31/2022   Medication Sig Dispense Refill    aspirin 81 MG EC tablet Take 81 mg by mouth every day.      Saw Palmetto 500 MG Cap Take 500 mg by mouth.      atorvastatin (LIPITOR) 40 MG Tab Take 1 Tablet by mouth every day. 90 Tablet 3    lisinopril (PRINIVIL) 5 MG Tab Take 1 Tablet by mouth every day. 90 Tablet 3    Multiple Vitamins-Minerals (PRESERVISION AREDS 2 PO) Take  by mouth.      Multiple Vitamin (MULTI VITAMIN DAILY PO) Take  by mouth.      Cholecalciferol (VITAMIN D) 400 UNIT Tab Take 400 Units by mouth every day.      Omega-3 Fatty Acids (FISH OIL) 1000 MG Cap capsule Take 1,000 mg by mouth every day.      cyanocobalamin (VITAMIN B-12) 100 MCG Tab Take 100 mcg by mouth every day.      Calcium-Vitamin D-Vitamin K (CALCIUM + D + K PO) Take  by mouth.      [DISCONTINUED] multivitamin (THERAPEUTIC MULTIVITAMIN) Tab Take 1 Tablet by mouth every day. (Patient not taking: Reported on 8/31/2022)      [DISCONTINUED] atorvastatin (LIPITOR) 40 MG Tab Take 1 Tablet by mouth every day. 90 Tablet 3    [DISCONTINUED] lisinopril (PRINIVIL) 5 MG Tab Take 1 Tablet by mouth every day. 90 Tablet 3    [DISCONTINUED] Saw Palmetto, Serenoa repens, (SAW PALMETTO PO) Take  by mouth every day. (Patient not taking: Reported on 8/31/2022)      [DISCONTINUED] aspirin 81 MG tablet Take 81 mg by mouth every day. (Patient not taking: Reported on 8/31/2022)       No facility-administered encounter medications on file as of 8/31/2022.     Review of Systems   Constitutional: Negative.  Negative for chills, fever and malaise/fatigue.   HENT: Negative.  Negative for sore throat.    Eyes: Negative.    Respiratory: Negative.  Negative for cough, hemoptysis, sputum production, shortness of breath,  "wheezing and stridor.    Cardiovascular: Negative.  Negative for chest pain, palpitations, orthopnea, claudication, leg swelling and PND.   Gastrointestinal: Negative.    Genitourinary: Negative.    Musculoskeletal: Negative.    Skin: Negative.    Neurological: Negative.  Negative for dizziness, loss of consciousness and weakness.   Endo/Heme/Allergies: Negative.  Does not bruise/bleed easily.   All other systems reviewed and are negative.     Objective:   /68 (BP Location: Left arm, Patient Position: Sitting, BP Cuff Size: Adult)   Pulse 65   Resp 14   Ht 1.727 m (5' 8\")   Wt 76.2 kg (168 lb)   SpO2 96%   BMI 25.54 kg/m²     Physical Exam  Vitals and nursing note reviewed.   Constitutional:       General: He is not in acute distress.     Appearance: He is well-developed. He is not diaphoretic.   HENT:      Head: Normocephalic and atraumatic.      Right Ear: External ear normal.      Left Ear: External ear normal.      Nose: Nose normal.      Mouth/Throat:      Pharynx: No oropharyngeal exudate.   Eyes:      General: No scleral icterus.        Right eye: No discharge.         Left eye: No discharge.      Conjunctiva/sclera: Conjunctivae normal.      Pupils: Pupils are equal, round, and reactive to light.   Neck:      Vascular: No JVD.   Cardiovascular:      Rate and Rhythm: Normal rate and regular rhythm.      Heart sounds: No murmur heard.    No friction rub. No gallop.   Pulmonary:      Effort: Pulmonary effort is normal. No respiratory distress.      Breath sounds: No stridor. No wheezing or rales.   Chest:      Chest wall: No tenderness.   Abdominal:      General: There is no distension.      Palpations: Abdomen is soft.      Tenderness: There is no guarding.   Musculoskeletal:         General: No tenderness or deformity. Normal range of motion.      Cervical back: Neck supple.   Skin:     General: Skin is warm and dry.      Coloration: Skin is not pale.      Findings: No erythema or rash. "   Neurological:      Mental Status: He is alert.      Cranial Nerves: No cranial nerve deficit.      Motor: No abnormal muscle tone.      Coordination: Coordination normal.      Deep Tendon Reflexes: Reflexes are normal and symmetric. Reflexes normal.   Psychiatric:         Behavior: Behavior normal.         Thought Content: Thought content normal.         Judgment: Judgment normal.     Lab Results   Component Value Date/Time    CHOLSTRLTOT 140 10/01/2021 11:53 AM    CHOLSTRLTOT 150 04/26/2013 09:20 AM    LDL 72 05/26/2020 11:56 AM    LDL 58 04/26/2013 09:20 AM    HDL 70 10/01/2021 11:53 AM    HDL 72 04/26/2013 09:20 AM    TRIGLYCERIDE 47 10/01/2021 11:53 AM    TRIGLYCERIDE 98 04/26/2013 09:20 AM       Lab Results   Component Value Date/Time    SODIUM 141 05/26/2020 11:56 AM    SODIUM 136 08/24/2013 05:30 AM    POTASSIUM 5.0 05/26/2020 11:56 AM    POTASSIUM 4.1 08/24/2013 05:30 AM    CHLORIDE 104 05/26/2020 11:56 AM    CHLORIDE 104 08/24/2013 05:30 AM    CO2 22 05/26/2020 11:56 AM    CO2 27 08/24/2013 05:30 AM    GLUCOSE 89 05/26/2020 11:56 AM    GLUCOSE 101 (H) 08/24/2013 05:30 AM    BUN 14 05/26/2020 11:56 AM    BUN 9 08/24/2013 05:30 AM    CREATININE 0.88 05/26/2020 11:56 AM    CREATININE 0.89 08/24/2013 05:30 AM    BUNCREATRAT 16 05/26/2020 11:56 AM     Lab Results   Component Value Date/Time    ALKPHOSPHAT 64 05/26/2020 11:56 AM    ALKPHOSPHAT 37 08/24/2013 05:30 AM    ASTSGOT 26 05/26/2020 11:56 AM    ASTSGOT 20 08/24/2013 05:30 AM    ALTSGPT 28 05/26/2020 11:56 AM    ALTSGPT 22 08/24/2013 05:30 AM    TBILIRUBIN 0.9 05/26/2020 11:56 AM    TBILIRUBIN 1.1 08/24/2013 05:30 AM        Assessment:     1. Dyslipidemia  atorvastatin (LIPITOR) 40 MG Tab    CBC W/ DIFF W/O PLATELETS    Comp Metabolic Panel    Lipid Profile      2. Essential hypertension  lisinopril (PRINIVIL) 5 MG Tab    CBC W/ DIFF W/O PLATELETS    Comp Metabolic Panel    Lipid Profile          Medical Decision Making:  Today's Assessment / Status /  Plan:     75-year-old male with hypertension hyperlipidemia and an abnormal calcium score. I am happy with how he is doing.  I have refilled his medications and will check his labs.  I will see him back in one year.

## 2022-09-08 LAB
ALBUMIN SERPL-MCNC: 4.4 G/DL (ref 3.7–4.7)
ALBUMIN/GLOB SERPL: 1.7 {RATIO} (ref 1.2–2.2)
ALP SERPL-CCNC: 74 IU/L (ref 44–121)
ALT SERPL-CCNC: 32 IU/L (ref 0–44)
AST SERPL-CCNC: 35 IU/L (ref 0–40)
BASOPHILS # BLD AUTO: 0 X10E3/UL (ref 0–0.2)
BASOPHILS NFR BLD AUTO: 1 %
BILIRUB SERPL-MCNC: 0.7 MG/DL (ref 0–1.2)
BUN SERPL-MCNC: 15 MG/DL (ref 8–27)
BUN/CREAT SERPL: 19 (ref 10–24)
CALCIUM SERPL-MCNC: 9.5 MG/DL (ref 8.6–10.2)
CHLORIDE SERPL-SCNC: 103 MMOL/L (ref 96–106)
CHOLEST SERPL-MCNC: 153 MG/DL (ref 100–199)
CO2 SERPL-SCNC: 23 MMOL/L (ref 20–29)
CREAT SERPL-MCNC: 0.81 MG/DL (ref 0.76–1.27)
EGFRCR-CYS SERPLBLD CKD-EPI 2021: 91 ML/MIN/1.73
EOSINOPHIL # BLD AUTO: 0 X10E3/UL (ref 0–0.4)
EOSINOPHIL NFR BLD AUTO: 1 %
ERYTHROCYTE [DISTWIDTH] IN BLOOD BY AUTOMATED COUNT: 12 % (ref 11.6–15.4)
GLOBULIN SER CALC-MCNC: 2.6 G/DL (ref 1.5–4.5)
GLUCOSE SERPL-MCNC: 84 MG/DL (ref 65–99)
HCT VFR BLD AUTO: 43.6 % (ref 37.5–51)
HDLC SERPL-MCNC: 78 MG/DL
HGB BLD-MCNC: 15.2 G/DL (ref 13–17.7)
IMM GRANULOCYTES # BLD AUTO: 0 X10E3/UL (ref 0–0.1)
IMM GRANULOCYTES NFR BLD AUTO: 0 %
IMMATURE CELLS  115398: ABNORMAL
LABORATORY COMMENT REPORT: NORMAL
LDLC SERPL CALC-MCNC: 62 MG/DL (ref 0–99)
LYMPHOCYTES # BLD AUTO: 1.4 X10E3/UL (ref 0.7–3.1)
LYMPHOCYTES NFR BLD AUTO: 30 %
MCH RBC QN AUTO: 38 PG (ref 26.6–33)
MCHC RBC AUTO-ENTMCNC: 34.9 G/DL (ref 31.5–35.7)
MCV RBC AUTO: 109 FL (ref 79–97)
MONOCYTES # BLD AUTO: 0.5 X10E3/UL (ref 0.1–0.9)
MONOCYTES NFR BLD AUTO: 10 %
MORPHOLOGY BLD-IMP: ABNORMAL
NEUTROPHILS # BLD AUTO: 2.8 X10E3/UL (ref 1.4–7)
NEUTROPHILS NFR BLD AUTO: 58 %
NRBC BLD AUTO-RTO: ABNORMAL %
POTASSIUM SERPL-SCNC: 4.6 MMOL/L (ref 3.5–5.2)
PROT SERPL-MCNC: 7 G/DL (ref 6–8.5)
RBC # BLD AUTO: 4 X10E6/UL (ref 4.14–5.8)
SODIUM SERPL-SCNC: 141 MMOL/L (ref 134–144)
TRIGL SERPL-MCNC: 64 MG/DL (ref 0–149)
VLDLC SERPL CALC-MCNC: 13 MG/DL (ref 5–40)
WBC # BLD AUTO: 4.8 X10E3/UL (ref 3.4–10.8)

## 2022-11-08 ENCOUNTER — PATIENT MESSAGE (OUTPATIENT)
Dept: HEALTH INFORMATION MANAGEMENT | Facility: OTHER | Age: 77
End: 2022-11-08

## 2023-08-21 ENCOUNTER — OFFICE VISIT (OUTPATIENT)
Dept: CARDIOLOGY | Facility: MEDICAL CENTER | Age: 78
End: 2023-08-21
Attending: STUDENT IN AN ORGANIZED HEALTH CARE EDUCATION/TRAINING PROGRAM
Payer: MEDICARE

## 2023-08-21 VITALS
HEIGHT: 68 IN | HEART RATE: 63 BPM | WEIGHT: 173 LBS | OXYGEN SATURATION: 98 % | DIASTOLIC BLOOD PRESSURE: 64 MMHG | SYSTOLIC BLOOD PRESSURE: 112 MMHG | BODY MASS INDEX: 26.22 KG/M2 | RESPIRATION RATE: 14 BRPM

## 2023-08-21 DIAGNOSIS — E78.5 DYSLIPIDEMIA: ICD-10-CM

## 2023-08-21 DIAGNOSIS — I10 ESSENTIAL HYPERTENSION: ICD-10-CM

## 2023-08-21 DIAGNOSIS — I25.10 CORONARY ARTERY DISEASE DUE TO CALCIFIED CORONARY LESION: ICD-10-CM

## 2023-08-21 DIAGNOSIS — I25.84 CORONARY ARTERY DISEASE DUE TO CALCIFIED CORONARY LESION: ICD-10-CM

## 2023-08-21 PROCEDURE — 99213 OFFICE O/P EST LOW 20 MIN: CPT | Performed by: STUDENT IN AN ORGANIZED HEALTH CARE EDUCATION/TRAINING PROGRAM

## 2023-08-21 PROCEDURE — 99212 OFFICE O/P EST SF 10 MIN: CPT | Performed by: STUDENT IN AN ORGANIZED HEALTH CARE EDUCATION/TRAINING PROGRAM

## 2023-08-21 PROCEDURE — 3074F SYST BP LT 130 MM HG: CPT | Performed by: STUDENT IN AN ORGANIZED HEALTH CARE EDUCATION/TRAINING PROGRAM

## 2023-08-21 PROCEDURE — 3078F DIAST BP <80 MM HG: CPT | Performed by: STUDENT IN AN ORGANIZED HEALTH CARE EDUCATION/TRAINING PROGRAM

## 2023-08-21 PROCEDURE — 99214 OFFICE O/P EST MOD 30 MIN: CPT | Performed by: STUDENT IN AN ORGANIZED HEALTH CARE EDUCATION/TRAINING PROGRAM

## 2023-08-21 ASSESSMENT — ENCOUNTER SYMPTOMS
IRREGULAR HEARTBEAT: 0
DYSPNEA ON EXERTION: 0
COUGH: 0
NAUSEA: 0
ORTHOPNEA: 0
PND: 0
NEAR-SYNCOPE: 0
SYNCOPE: 0
WEAKNESS: 0
WHEEZING: 0
FEVER: 0
DIZZINESS: 0
DIARRHEA: 0
VOMITING: 0
ABDOMINAL PAIN: 0
NIGHT SWEATS: 0
PALPITATIONS: 0
SHORTNESS OF BREATH: 0
FOCAL WEAKNESS: 0

## 2023-08-21 NOTE — PROGRESS NOTES
Cardiology Follow-up Consultation Note    Date of note:    8/21/2023    Primary Care Provider: Ashley Ayala M.D.    Patient Name: Jimenez Villalba   YOB: 1945  MRN:              [id number]    Chief Complaint: Follow-up elevated calcium score    History of Present Illness: Mr. Jimenez Villalba is a 78 y.o. male whose current medical problems include hypertension, elevated calcium score, and dyslipidemia who is here for follow-up.    The patient was previously a patient of Dr. Wilson, last seen 8/31/2022.  He was doing well during the last visit, and no changes were made to his medications.    The patient presents today for follow-up.  The patient reports feeling well today.  He denies any chest pain or shortness of breath on exertion.  He typically walks 3 to 4 times a week for about 30 minutes without any problems.  However, in the last couple weeks, he has been busy due to taking care of his wife with dementia, and had not been exercising as much.  He plans to go on the treadmill that he has at home.  He denies any orthopnea, PND, or leg swelling.  No palpitations.  No syncope or presyncopal episodes.    Cardiovascular Risk Factors:  1. Smoking status: Former smoker  2. Type II Diabetes Mellitus: None/not recently checked  3. Hypertension: On medication  4. Dyslipidemia: On statin  Cholesterol,Tot   Date Value Ref Range Status   09/07/2022 153 100 - 199 mg/dL Final   04/26/2013 150 100 - 199 mg/dL Final     LDL   Date Value Ref Range Status   05/26/2020 72 0 - 99 mg/dL Final   04/26/2013 58 0 - 99 mg/dL Final     HDL   Date Value Ref Range Status   09/07/2022 78 >39 mg/dL Final   04/26/2013 72 >39 mg/dL Final     Comment:     According to ATP-III Guidelines, HDL-C >59 mg/dL is considered a  negative risk factor for CHD.     Triglycerides   Date Value Ref Range Status   09/07/2022 64 0 - 149 mg/dL Final   04/26/2013 98 0 - 149 mg/dL Final     5. Family history of early Coronary Artery  "Disease in a first degree relative (Male less than 55 years of age; Female less than 65 years of age): None  6.  Obesity and/or Metabolic Syndrome: Body mass index is 26.3 kg/m².  7. Sedentary lifestyle: Walks 3-4 times a week    Review of Systems   Constitutional: Negative for fever, malaise/fatigue and night sweats.   Cardiovascular:  Negative for chest pain, dyspnea on exertion, irregular heartbeat, leg swelling, near-syncope, orthopnea, palpitations, paroxysmal nocturnal dyspnea and syncope.   Respiratory:  Negative for cough, shortness of breath and wheezing.    Gastrointestinal:  Negative for abdominal pain, diarrhea, nausea and vomiting.   Neurological:  Negative for dizziness, focal weakness and weakness.       All other systems reviewed and are negative.     Current Outpatient Medications   Medication Sig Dispense Refill    aspirin 81 MG EC tablet Take 81 mg by mouth every day.      Saw Palmetto 500 MG Cap Take 500 mg by mouth.      atorvastatin (LIPITOR) 40 MG Tab Take 1 Tablet by mouth every day. 90 Tablet 3    lisinopril (PRINIVIL) 5 MG Tab Take 1 Tablet by mouth every day. 90 Tablet 3    Multiple Vitamins-Minerals (PRESERVISION AREDS 2 PO) Take  by mouth.      Multiple Vitamin (MULTI VITAMIN DAILY PO) Take  by mouth.      Cholecalciferol (VITAMIN D) 400 UNIT Tab Take 400 Units by mouth every day.      Omega-3 Fatty Acids (FISH OIL) 1000 MG Cap capsule Take 1,000 mg by mouth every day.      cyanocobalamin (VITAMIN B-12) 100 MCG Tab Take 100 mcg by mouth every day.      Calcium-Vitamin D-Vitamin K (CALCIUM + D + K PO) Take  by mouth.       No current facility-administered medications for this visit.         Allergies   Allergen Reactions    Nkda [No Known Drug Allergy]        Physical Exam:  Ambulatory Vitals  /64 (BP Location: Left arm, Patient Position: Sitting, BP Cuff Size: Adult)   Pulse 63   Resp 14   Ht 1.727 m (5' 8\")   Wt 78.5 kg (173 lb)   SpO2 98%    Oxygen Therapy:  Pulse Oximetry: " 98 %  BP Readings from Last 4 Encounters:   08/21/23 112/64   08/31/22 126/68   10/13/21 138/86   07/27/20 128/88       Weight/BMI: Body mass index is 26.3 kg/m².  Wt Readings from Last 4 Encounters:   08/21/23 78.5 kg (173 lb)   08/31/22 76.2 kg (168 lb)   10/13/21 80.7 kg (178 lb)   07/27/20 79.4 kg (175 lb)       General: Well appearing and in no apparent distress  Eyes: nl conjunctiva, no icteric sclera  ENT: normal external appearance of ears, nose, and throat  Neck: no visible JVP,  no carotid bruits  Lungs: normal respiratory effort, CTAB  Heart: RRR, no murmurs, no rubs or gallops,  no edema bilateral lower extremities. No LV/RV heave on cardiac palpatation. + bilateral radial pulses.  + bilateral dp pulses.   Abdomen: soft, non tender, non distended, no masses, normal bowel sounds.  No HSM.  Extremities/MSK: no clubbing, no cyanosis  Neurological: No focal sensory deficits  Psychiatric: Appropriate affect, A/O x 3, intact judgement and insight  Skin: Warm extremities      Lab Data Review:  Lab Results   Component Value Date/Time    CHOLSTRLTOT 153 09/07/2022 11:06 AM    CHOLSTRLTOT 150 04/26/2013 09:20 AM    LDL 72 05/26/2020 11:56 AM    LDL 58 04/26/2013 09:20 AM    HDL 78 09/07/2022 11:06 AM    HDL 72 04/26/2013 09:20 AM    TRIGLYCERIDE 64 09/07/2022 11:06 AM    TRIGLYCERIDE 98 04/26/2013 09:20 AM       Lab Results   Component Value Date/Time    SODIUM 141 09/07/2022 11:06 AM    SODIUM 136 08/24/2013 05:30 AM    POTASSIUM 4.6 09/07/2022 11:06 AM    POTASSIUM 4.1 08/24/2013 05:30 AM    CHLORIDE 103 09/07/2022 11:06 AM    CHLORIDE 104 08/24/2013 05:30 AM    CO2 23 09/07/2022 11:06 AM    CO2 27 08/24/2013 05:30 AM    GLUCOSE 84 09/07/2022 11:06 AM    GLUCOSE 101 (H) 08/24/2013 05:30 AM    BUN 15 09/07/2022 11:06 AM    BUN 9 08/24/2013 05:30 AM    CREATININE 0.81 09/07/2022 11:06 AM    CREATININE 0.89 08/24/2013 05:30 AM    BUNCREATRAT 19 09/07/2022 11:06 AM     Lab Results   Component Value Date/Time     "ALKPHOSPHAT 74 09/07/2022 11:06 AM    ALKPHOSPHAT 37 08/24/2013 05:30 AM    ASTSGOT 35 09/07/2022 11:06 AM    ASTSGOT 20 08/24/2013 05:30 AM    ALTSGPT 32 09/07/2022 11:06 AM    ALTSGPT 22 08/24/2013 05:30 AM    TBILIRUBIN 0.7 09/07/2022 11:06 AM    TBILIRUBIN 1.1 08/24/2013 05:30 AM      Lab Results   Component Value Date/Time    WBC 4.8 09/07/2022 11:06 AM    WBC 6.0 08/24/2013 05:30 AM    HEMOGLOBIN 15.2 09/07/2022 11:06 AM    HEMOGLOBIN 15.9 08/24/2013 05:30 AM     No results found for: \"HBA1C\"      Cardiac Imaging and Procedures Review:    EKG dated 3/29/2019: My personal interpretation is sinus bradycardia, PACs    Echo dated 5/12/2015:   CONCLUSIONS  Normal left ventricular chamber size. Moderate concentric left   ventricular hypertrophy, 1.5 cm. Normal regional wall motion. Normal   left ventricular systolic function. Left ventricular ejection fraction   is 65% to 70%. Grade I diastolic dysfunction - mitral inflow E/A is   <1.0.    Stress echo 4/23/2019:  CONCLUSIONS   Negative stress echocardiogram for ischemia with adequate stress   achieved by heart rate criteria.       Assessment & Plan     1. Coronary artery disease due to calcified coronary lesion: Coronary calcification seen on CT  Lipid Profile      2. Dyslipidemia  Lipid Profile      3. Essential hypertension              Shared Medical Decision Making:    Elevated calcium score  Dyslipidemia  Asymptomatic.  Stress test 4/2019 was negative.  -Continue aspirin and atorvastatin 40 mg daily  -Counseled on heart healthy diet and exercise  -Repeat lipid panel prior to next visit    Hypertension  BP well controlled this visit  -Continue lisinopril 5 mg daily    All of the patient's excellent questions were answered to the best of my knowledge and to his satisfaction.  It was a pleasure seeing Mr. Jimenez Villalba in my clinic today. Return in about 1 year (around 8/21/2024), or if symptoms worsen or fail to improve. Patient is aware to call the cardiology " clinic with any questions or concerns.      Domenico Parmar MD  [facility], [address]  [address] [address]  Phone: [phone]  Fax: [phone]

## 2023-10-10 DIAGNOSIS — E78.5 DYSLIPIDEMIA: ICD-10-CM

## 2023-10-10 RX ORDER — ATORVASTATIN CALCIUM 40 MG/1
40 TABLET, FILM COATED ORAL
Qty: 90 TABLET | Refills: 3 | Status: SHIPPED | OUTPATIENT
Start: 2023-10-10

## 2023-10-10 NOTE — TELEPHONE ENCOUNTER
Is the patient due for a refill? Yes    Was the patient seen the past year? Yes    Date of last office visit: 8/21/23    Does the patient have an upcoming appointment?  No    Provider to refill:HK    Does the patients insurance require a 100 day supply?  No

## 2023-11-29 ENCOUNTER — PATIENT MESSAGE (OUTPATIENT)
Dept: HEALTH INFORMATION MANAGEMENT | Facility: OTHER | Age: 78
End: 2023-11-29

## 2023-11-30 DIAGNOSIS — I10 ESSENTIAL HYPERTENSION: ICD-10-CM

## 2023-11-30 RX ORDER — LISINOPRIL 5 MG/1
5 TABLET ORAL DAILY
Qty: 90 TABLET | Refills: 3 | Status: SHIPPED | OUTPATIENT
Start: 2023-11-30

## 2023-11-30 NOTE — TELEPHONE ENCOUNTER
HK    Received request via: Patient    Was the patient seen in the last year in this department? Yes    Does the patient have an active prescription (recently filled or refills available) for medication(s) requested? Yes.     Does the patient have [facility] and need 100 day supply (blood pressure, diabetes and cholesterol meds only)? Patient does not have SCP    Patient told by pharmacy that we denied refill.   Patient has approximately three days remaining.    Thank you,  Vanna MORRIS

## 2023-11-30 NOTE — TELEPHONE ENCOUNTER
Is the patient due for a refill? Yes    Was the patient seen the past year? Yes    Date of last office visit: 08/21/23    Does the patient have an upcoming appointment?  No    Provider to refill:HK    Does the patients insurance require a 100 day supply?  No

## 2024-04-16 ENCOUNTER — TELEPHONE (OUTPATIENT)
Dept: CARDIOLOGY | Facility: MEDICAL CENTER | Age: 79
End: 2024-04-16
Payer: MEDICARE

## 2024-04-16 NOTE — TELEPHONE ENCOUNTER
ANGELICA  Caller: Jimenez Villalba    Topic/issue: Patient would like his lab orders sent to [facility] on [address]. Please see Labs, Lipid Panel. Please call patient back when this is done.    Callback Number: [phone]    Thank you,  Mona ZHOU

## 2024-04-18 NOTE — TELEPHONE ENCOUNTER
Labs faxed to [facility] on [address]. Fax confirmation received and sent to [employer] for scanning.

## 2024-07-10 ENCOUNTER — TELEPHONE (OUTPATIENT)
Dept: CARDIOLOGY | Facility: MEDICAL CENTER | Age: 79
End: 2024-07-10
Payer: MEDICARE

## 2024-07-10 DIAGNOSIS — I25.84 CORONARY ARTERY DISEASE DUE TO CALCIFIED CORONARY LESION: ICD-10-CM

## 2024-07-10 DIAGNOSIS — I25.10 CORONARY ARTERY DISEASE DUE TO CALCIFIED CORONARY LESION: ICD-10-CM

## 2024-10-08 ENCOUNTER — TELEPHONE (OUTPATIENT)
Dept: CARDIOLOGY | Facility: MEDICAL CENTER | Age: 79
End: 2024-10-08
Payer: MEDICARE

## 2024-10-16 DIAGNOSIS — E78.5 DYSLIPIDEMIA: ICD-10-CM

## 2024-10-16 RX ORDER — ATORVASTATIN CALCIUM 40 MG/1
40 TABLET, FILM COATED ORAL
Qty: 90 TABLET | Refills: 0 | Status: SHIPPED | OUTPATIENT
Start: 2024-10-16

## 2024-10-22 LAB
BUN SERPL-MCNC: 11 MG/DL (ref 8–27)
BUN/CREAT SERPL: 13 (ref 10–24)
CHLORIDE SERPL-SCNC: 106 MMOL/L (ref 96–106)
CO2 SERPL-SCNC: 22 MMOL/L (ref 20–29)
CREAT SERPL-MCNC: 0.86 MG/DL (ref 0.76–1.27)
EGFRCR SERPLBLD CKD-EPI 2021: 89 ML/MIN/1.73
GLUCOSE SERPL-MCNC: 87 MG/DL (ref 70–99)
POTASSIUM SERPL-SCNC: 4.7 MMOL/L (ref 3.5–5.2)
SODIUM SERPL-SCNC: 143 MMOL/L (ref 134–144)

## 2024-11-04 ASSESSMENT — ENCOUNTER SYMPTOMS
FEVER: 0
ABDOMINAL PAIN: 0
PND: 0
NEAR-SYNCOPE: 0
PALPITATIONS: 0
DIZZINESS: 0
WEAKNESS: 0
NIGHT SWEATS: 0
IRREGULAR HEARTBEAT: 0
DIARRHEA: 0
WHEEZING: 0
ORTHOPNEA: 0
DYSPNEA ON EXERTION: 0
NAUSEA: 0
SHORTNESS OF BREATH: 0
SYNCOPE: 0
COUGH: 0
FOCAL WEAKNESS: 0
VOMITING: 0

## 2024-11-05 ENCOUNTER — OFFICE VISIT (OUTPATIENT)
Dept: CARDIOLOGY | Facility: MEDICAL CENTER | Age: 79
End: 2024-11-05
Attending: STUDENT IN AN ORGANIZED HEALTH CARE EDUCATION/TRAINING PROGRAM
Payer: MEDICARE

## 2024-11-05 VITALS
HEART RATE: 67 BPM | BODY MASS INDEX: 27 KG/M2 | OXYGEN SATURATION: 98 % | RESPIRATION RATE: 14 BRPM | WEIGHT: 168 LBS | HEIGHT: 66 IN | DIASTOLIC BLOOD PRESSURE: 88 MMHG | SYSTOLIC BLOOD PRESSURE: 122 MMHG

## 2024-11-05 DIAGNOSIS — I25.10 CORONARY ARTERY DISEASE DUE TO CALCIFIED CORONARY LESION: ICD-10-CM

## 2024-11-05 DIAGNOSIS — I10 ESSENTIAL HYPERTENSION: ICD-10-CM

## 2024-11-05 DIAGNOSIS — E78.5 DYSLIPIDEMIA: ICD-10-CM

## 2024-11-05 DIAGNOSIS — I25.84 CORONARY ARTERY DISEASE DUE TO CALCIFIED CORONARY LESION: ICD-10-CM

## 2024-11-05 PROCEDURE — 99214 OFFICE O/P EST MOD 30 MIN: CPT | Performed by: STUDENT IN AN ORGANIZED HEALTH CARE EDUCATION/TRAINING PROGRAM

## 2024-11-05 PROCEDURE — 3079F DIAST BP 80-89 MM HG: CPT | Performed by: STUDENT IN AN ORGANIZED HEALTH CARE EDUCATION/TRAINING PROGRAM

## 2024-11-05 PROCEDURE — 99212 OFFICE O/P EST SF 10 MIN: CPT | Performed by: STUDENT IN AN ORGANIZED HEALTH CARE EDUCATION/TRAINING PROGRAM

## 2024-11-05 PROCEDURE — 3074F SYST BP LT 130 MM HG: CPT | Performed by: STUDENT IN AN ORGANIZED HEALTH CARE EDUCATION/TRAINING PROGRAM

## 2024-11-05 NOTE — PROGRESS NOTES
Cardiology Follow-up Consultation Note    Date of note:   11/05/24  Primary Care Provider: Ashley Ayala M.D.    Patient Name: Jimenez Villalba   YOB: 1945  MRN:              [id number]    Chief Complaint: Follow-up elevated calcium score    History of Present Illness: Mr. Jimenez Villalba is a 79 y.o. male whose current medical problems include hypertension, elevated calcium score, and dyslipidemia who is here for follow-up.    The patient was last seen in my clinic on  8/21/2023  (his initial visit with me, previously a patient of Dr. Wilson).  He was doing well during the last visit, and no changes were made to his medications.    The patient presents today for follow-up.  The patient reports feeling well today.  He denies any chest pain or shortness of breath on exertion.  He hasn't been walking as much lately but does walk at least once a week for about 30 minutes without any problems (used to walk 3-5 times a week, and getting back to it).  He does take care of his wife with Alzheimers.  He can easily go up 1-2 flights of stairs at his home without any problems. He denies any orthopnea, PND, or leg swelling.  No palpitations.  No syncope or presyncopal episodes.    Cardiovascular Risk Factors:  1. Smoking status: Former smoker  2. Type II Diabetes Mellitus: None/not recently checked  3. Hypertension: On medication  4. Dyslipidemia: On statin  Cholesterol, Total  100 - 199 mg/dL 166   Triglycerides  0 - 149 mg/dL 72   HDL Cholesterol  >39 mg/dL 82   VLDL CHOLESTEROL  5 - 40 mg/dL 14   LDL Cholesterol Calc  0 - 99 mg/dL 70   10/16/2024  5. Family history of early Coronary Artery Disease in a first degree relative (Male less than 55 years of age; Female less than 65 years of age): None  6.  Obesity and/or Metabolic Syndrome: Body mass index is 27.12 kg/m².  7. Sedentary lifestyle: Active    Review of Systems   Constitutional: Negative for fever, malaise/fatigue and night sweats.  "  Cardiovascular:  Negative for chest pain, dyspnea on exertion, irregular heartbeat, leg swelling, near-syncope, orthopnea, palpitations, paroxysmal nocturnal dyspnea and syncope.   Respiratory:  Negative for cough, shortness of breath and wheezing.    Gastrointestinal:  Negative for abdominal pain, diarrhea, nausea and vomiting.   Neurological:  Negative for dizziness, focal weakness and weakness.       All other systems reviewed and are negative.     Current Outpatient Medications   Medication Sig Dispense Refill    atorvastatin (LIPITOR) 40 MG Tab TAKE 1 TABLET BY MOUTH EVERY DAY 90 Tablet 0    lisinopril (PRINIVIL) 5 MG Tab Take 1 Tablet by mouth every day. 90 Tablet 3    aspirin 81 MG EC tablet Take 81 mg by mouth every day.      Saw Palmetto 500 MG Cap Take 500 mg by mouth.      Multiple Vitamins-Minerals (PRESERVISION AREDS 2 PO) Take  by mouth.      Multiple Vitamin (MULTI VITAMIN DAILY PO) Take  by mouth.      Cholecalciferol (VITAMIN D) 400 UNIT Tab Take 400 Units by mouth every day.      Omega-3 Fatty Acids (FISH OIL) 1000 MG Cap capsule Take 1,000 mg by mouth every day.      cyanocobalamin (VITAMIN B-12) 100 MCG Tab Take 100 mcg by mouth every day.      Calcium-Vitamin D-Vitamin K (CALCIUM + D + K PO) Take  by mouth.       No current facility-administered medications for this visit.         Allergies   Allergen Reactions    Nkda [No Known Drug Allergy]        Physical Exam:  Ambulatory Vitals  /88 (BP Location: Left arm, Patient Position: Sitting, BP Cuff Size: Adult)   Pulse 67   Resp 14   Ht 1.676 m (5' 6\")   Wt 76.2 kg (168 lb)   SpO2 98%    Oxygen Therapy:  Pulse Oximetry: 98 %  BP Readings from Last 4 Encounters:   11/05/24 122/88   08/21/23 112/64   08/31/22 126/68   10/13/21 138/86       Weight/BMI: Body mass index is 27.12 kg/m².  Wt Readings from Last 4 Encounters:   11/05/24 76.2 kg (168 lb)   08/21/23 78.5 kg (173 lb)   08/31/22 76.2 kg (168 lb)   10/13/21 80.7 kg (178 lb) "       General: Well appearing and in no apparent distress  Eyes: nl conjunctiva, no icteric sclera  ENT: normal external appearance of ears, nose, and throat  Neck: no visible JVP,  no carotid bruits  Lungs: normal respiratory effort, CTAB  Heart: RRR, no murmurs, no rubs or gallops,  no edema bilateral lower extremities. No LV/RV heave on cardiac palpatation. + bilateral radial pulses.  + bilateral dp pulses.   Abdomen: soft, non tender, non distended, no masses, normal bowel sounds.  No HSM.  Extremities/MSK: no clubbing, no cyanosis  Neurological: No focal sensory deficits  Psychiatric: Appropriate affect, A/O x 3, intact judgement and insight  Skin: Warm extremities      Lab Data Review:  Lab Results   Component Value Date/Time    CHOLSTRLTOT 134 09/07/2023 10:25 AM    CHOLSTRLTOT 150 04/26/2013 09:20 AM    LDL 72 05/26/2020 11:56 AM    LDL 58 04/26/2013 09:20 AM    HDL 73 09/07/2023 10:25 AM    HDL 72 04/26/2013 09:20 AM    TRIGLYCERIDE 54 09/07/2023 10:25 AM    TRIGLYCERIDE 98 04/26/2013 09:20 AM       Lab Results   Component Value Date/Time    SODIUM 143 10/21/2024 08:02 AM    SODIUM 136 08/24/2013 05:30 AM    POTASSIUM 4.7 10/21/2024 08:02 AM    POTASSIUM 4.1 08/24/2013 05:30 AM    CHLORIDE 106 10/21/2024 08:02 AM    CHLORIDE 104 08/24/2013 05:30 AM    CO2 22 10/21/2024 08:02 AM    CO2 27 08/24/2013 05:30 AM    GLUCOSE 87 10/21/2024 08:02 AM    GLUCOSE 101 (H) 08/24/2013 05:30 AM    BUN 11 10/21/2024 08:02 AM    BUN 9 08/24/2013 05:30 AM    CREATININE 0.86 10/21/2024 08:02 AM    CREATININE 0.89 08/24/2013 05:30 AM    BUNCREATRAT 13 10/21/2024 08:02 AM     Lab Results   Component Value Date/Time    ALKPHOSPHAT 74 09/07/2022 11:06 AM    ALKPHOSPHAT 37 08/24/2013 05:30 AM    ASTSGOT 35 09/07/2022 11:06 AM    ASTSGOT 20 08/24/2013 05:30 AM    ALTSGPT 32 09/07/2022 11:06 AM    ALTSGPT 22 08/24/2013 05:30 AM    TBILIRUBIN 0.7 09/07/2022 11:06 AM    TBILIRUBIN 1.1 08/24/2013 05:30 AM      Lab Results   Component  Value Date/Time    WBC 4.8 09/07/2022 11:06 AM    WBC 6.0 08/24/2013 05:30 AM    HEMOGLOBIN 16.1 10/15/2024 11:01 AM    HEMOGLOBIN 15.2 09/07/2022 11:06 AM    HEMOGLOBIN 15.9 08/24/2013 05:30 AM     Lab Results   Component Value Date/Time    HBA1C 5.0 10/15/2024 11:01 AM         Cardiac Imaging and Procedures Review:    EKG dated 3/29/2019: My personal interpretation is sinus bradycardia, PACs    Echo dated 5/12/2015:   CONCLUSIONS  Normal left ventricular chamber size. Moderate concentric left   ventricular hypertrophy, 1.5 cm. Normal regional wall motion. Normal   left ventricular systolic function. Left ventricular ejection fraction   is 65% to 70%. Grade I diastolic dysfunction - mitral inflow E/A is   <1.0.    Stress echo 4/23/2019:  CONCLUSIONS   Negative stress echocardiogram for ischemia with adequate stress   achieved by heart rate criteria.       Assessment & Plan     1. Coronary artery disease due to calcified coronary lesion: Coronary calcification seen on CT        2. Essential hypertension        3. Dyslipidemia                Shared Medical Decision Making:    Elevated calcium score  Dyslipidemia  Asymptomatic.  Stress test 4/2019 was negative.  -Continue aspirin and atorvastatin 40 mg daily  -Counseled on heart healthy diet and exercise    Hypertension  BP well controlled this visit  -Continue lisinopril 5 mg daily    All of the patient's excellent questions were answered to the best of my knowledge and to his satisfaction.  It was a pleasure seeing Mr. Jimenez Villalba in my clinic today. Return in about 1 year (around 11/5/2025), or if symptoms worsen or fail to improve. Patient is aware to call the cardiology clinic with any questions or concerns.      Domenico Parmar MD  [facility]  [facility], [address]  [address] [address]  Phone: [phone]  Fax: [phone]

## 2024-11-20 DIAGNOSIS — I10 ESSENTIAL HYPERTENSION: ICD-10-CM

## 2024-11-21 NOTE — TELEPHONE ENCOUNTER
Is the patient due for a refill? Yes    Was the patient seen the past year? Yes    Date of last office visit: 11.05.2024    Does the patient have an upcoming appointment?  No    Provider to refill: HK    Does the patient have assisted Plus and need 100-day supply? (This applies to ALL medications) Patient does not have SCP

## 2024-11-22 RX ORDER — LISINOPRIL 5 MG/1
5 TABLET ORAL DAILY
Qty: 90 TABLET | Refills: 3 | Status: SHIPPED | OUTPATIENT
Start: 2024-11-22

## 2025-01-21 DIAGNOSIS — E78.5 DYSLIPIDEMIA: ICD-10-CM

## 2025-01-21 RX ORDER — ATORVASTATIN CALCIUM 40 MG/1
40 TABLET, FILM COATED ORAL
Qty: 90 TABLET | Refills: 3 | Status: SHIPPED | OUTPATIENT
Start: 2025-01-21

## 2025-01-21 NOTE — TELEPHONE ENCOUNTER
Is the patient due for a refill? Yes    Was the patient seen the last 15 months? Yes    Date of last office visit: 11.5.2024    Does the patient have an upcoming appointment?  No    Provider to refill:HK    Does the patient have half-way Plus and need 100-day supply? (This applies to ALL medications) Patient does not have SCP